# Patient Record
Sex: FEMALE | Race: BLACK OR AFRICAN AMERICAN | Employment: UNEMPLOYED | URBAN - METROPOLITAN AREA
[De-identification: names, ages, dates, MRNs, and addresses within clinical notes are randomized per-mention and may not be internally consistent; named-entity substitution may affect disease eponyms.]

---

## 2017-10-26 PROBLEM — M87.052 AVASCULAR NECROSIS OF HIP, LEFT (HCC): Status: ACTIVE | Noted: 2017-10-26

## 2018-07-18 ENCOUNTER — OFFICE VISIT (OUTPATIENT)
Dept: ORTHOPEDIC SURGERY | Facility: CLINIC | Age: 48
End: 2018-07-18

## 2018-07-18 VITALS
WEIGHT: 170.6 LBS | HEART RATE: 82 BPM | HEIGHT: 61 IN | TEMPERATURE: 98.1 F | OXYGEN SATURATION: 98 % | DIASTOLIC BLOOD PRESSURE: 82 MMHG | RESPIRATION RATE: 16 BRPM | BODY MASS INDEX: 32.21 KG/M2 | SYSTOLIC BLOOD PRESSURE: 148 MMHG

## 2018-07-18 DIAGNOSIS — M87.052 AVASCULAR NECROSIS OF LEFT FEMORAL HEAD (HCC): Primary | Chronic | ICD-10-CM

## 2018-07-18 DIAGNOSIS — M21.70 LEG LENGTH DIFFERENCE, ACQUIRED: ICD-10-CM

## 2018-07-18 DIAGNOSIS — M25.462 EFFUSION OF KNEE JOINT, LEFT: ICD-10-CM

## 2018-07-18 DIAGNOSIS — M87.00 AVASCULAR NECROSIS (HCC): ICD-10-CM

## 2018-07-18 DIAGNOSIS — M25.562 LEFT KNEE PAIN, UNSPECIFIED CHRONICITY: ICD-10-CM

## 2018-07-18 DIAGNOSIS — Z96.642 STATUS POST TOTAL HIP REPLACEMENT, LEFT: ICD-10-CM

## 2018-07-18 RX ORDER — CITALOPRAM 20 MG/1
TABLET, FILM COATED ORAL DAILY
COMMUNITY
End: 2020-06-19

## 2018-07-18 RX ORDER — DICLOFENAC SODIUM 75 MG/1
75 TABLET, DELAYED RELEASE ORAL 2 TIMES DAILY
Qty: 60 TAB | Refills: 1 | Status: SHIPPED | OUTPATIENT
Start: 2018-07-18 | End: 2018-10-26 | Stop reason: SDUPTHER

## 2018-07-18 NOTE — PROGRESS NOTES
HISTORY OF PRESENT ILLNESS:  Virgie Maurice is a 54-year-old female who had her left hip replaced in November 2017. This was done in the [facility] area. Postoperatively, she noticed a significant leg length difference. She was scheduled to have a revision done in February, but this was cancelled by the physician. She used to wear a small lift in her shoe, but this did not make up the difference at all. She does take Advil, Aleve, or Ibuprofen. She does describe some sciatic type symptoms in the left leg. She also notes some back discomfort. Her hip surgery, initially, was performed for AVN. She was told she had some AVN in her right hip, but her right hip does not bother her much at this point. She does use a roll aid for ambulation assistance. PHYSICAL EXAMINATION:   Clinical examination reveals a 54-year-old female, slightly overweight, who ambulates with a roll aid. She does not ambulate with an antalgic gait but does have a significant leg length difference clinically. With reference to her right hip, she is able straight leg raise about 80° today without discomfort. She has good passive rotation actively and passively without pain. Right hip roll test is negative. Andres's test on the right side is negative. With reference to the left hip, she is able to straight leg raise about 60-70°, but this does reproduce some pain in the sciatic nerve region. She has fair passive range of motion of the left hip but does flex up into external rotation. Left hip roll test results in slight pain in the left buttock. Andres's test is positive on the left side for pain in the anterior aspect of her left hip. She has a significant leg length discrepancy with the left leg being about 2 inches longer than her right leg. With reference to her left knee, she does have a mild effusion of her left knee. She has good functional range of motion of her left knee.   She has no palpable medial or lateral joint line tenderness. Liane's test is negative. Lachman's test is negative. She has good collateral, as well as cruciate ligament stability of the left knee. She has no left calf tenderness or swelling. Neurovascular testing is intact is intact to the lower extremities distally to motor strength and sensation. RADIOGRAPHS:  X-rays of her left knee today reveal no acute osseous pathology. On standing AP radiographs, there is a significant leg length difference on the AP radiographs of her knees with the left joint line being much more distal than her right knee. X-rays of her pelvis today reveal significant leg length discrepancy. Her femoral component appears to be slightly oversized with no significant anteversion. The femoral stem is in slight varus but appears to be well-fixed. There is, rounding off at the calcar, a remodeling there. She has mild AVN of the right hip. She has one small cyst in the superior aspect of the femoral head but no significant collapse, and there is good joint space remaining in the weightbearing portion of her right hip. This does match her clinical examination of the right hip also. IMPRESSION:      1. Leg length discrepancy secondary to total hip arthroplasty. 2. Effusion, left knee. 3. AVN right hip, relatively asymptomatic. RECOMMENDATIONS:  I have discussed with the patient her leg length discrepancy. I have discussed with her using a lift built into her shoe on the right side. I will also place her on an anti-inflammatory medication and see if this will relieve some of the inflammation in this left lower extremity and sciatic nerve. I have discussed with her revision surgery. She is seeing another opinion, i.e. Dr. Wan Jose. He referred her to [facility] and to a university setting.   I have discussed with her the potential complications of this revision surgery, including infection, bleeding, nerve damage, deep venous thrombosis, pulmonary emboli, death, failure of the prosthesis, as well failure of the procedure. We discussed hospitalization and rehabilitation postoperatively, leg length discrepancy, dislocation precautions. I also discussed with the patient alternate bearing surfaces and alternate approaches. The patient understands. I did find her component sizes and company of the prosthesis in her hospital records in case she needs them. I went through this with her today. All her questions were answered, and she is probably going to followup at the university setting, but she is aware of the risks associated with revision of this problem status post total hip arthroplasty surgery. grb                 Vitals:    07/18/18 1354   BP: 148/82   Pulse: 82   Resp: 16   Temp: 98.1 °F (36.7 °C)   TempSrc: Oral   SpO2: 98%   Weight: 170 lb 9.6 oz (77.4 kg)   Height: 5' 1\" (1.549 m)   PainSc:   8       Patient Active Problem List   Diagnosis Code    Avascular necrosis of left femoral head (HCC) M87.052    Chronic back pain M54.9, G89.29    Hormone replacement therapy Z79.890    Chronic pain syndrome G89.4    Attention deficit disorder (ADD) F98.8    Bipolar disorder (HCC) F31.9    Allergic rhinitis J30.9    Avascular necrosis of hip, left (HCC) M87.052     Patient Active Problem List    Diagnosis Date Noted    Avascular necrosis of hip, left (HCC) 10/26/2017    Chronic pain syndrome 11/18/2016    Attention deficit disorder (ADD) 11/18/2016    Bipolar disorder ([facility] 11/18/2016    Allergic rhinitis 11/18/2016    Avascular necrosis of left femoral head (HCC) 11/17/2016    Chronic back pain 11/17/2016    Hormone replacement therapy 11/17/2016     Current Outpatient Prescriptions   Medication Sig Dispense Refill    citalopram (CELEXA) 20 mg tablet Take  by mouth daily.  gabapentin (NEURONTIN) 300 mg capsule Take 900 mg by mouth two (2) times a day.  Indications: NEUROPATHIC PAIN      oxyCODONE-acetaminophen (PERCOCET)  mg per tablet Take 1 Tab by mouth every six (6) hours as needed for Pain.  hydrOXYzine HCl (ATARAX) 25 mg tablet Take 25 mg by mouth nightly.  cariprazine (VRAYLAR) 1.5 mg cap Take 1 Tab by mouth daily.  Azelastine (ASTEPRO) 0.15 % (205.5 mcg) nasal spray 2 Sprays by Both Nostrils route two (2) times a day.  fluticasone (FLONASE) 50 mcg/actuation nasal spray 2 Sprays by Both Nostrils route daily.  estradiol (ESTRACE) 1 mg tablet Take 1 mg by mouth daily.  diclofenac (VOLTAREN) 1 % gel Apply  to affected area four (4) times daily.  Indications: knees and shoulder prn       Allergies   Allergen Reactions    Other Food Anaphylaxis     Peaches- closes throat    Nectarine Itching     Past Medical History:   Diagnosis Date    Arthritis     Degenerative disc disease, lumbar     Joint pain     Neck pain     Psychiatric disorder     bipolar, anxiety and depression, ADHD, panic attacks     Past Surgical History:   Procedure Laterality Date    HX APPENDECTOMY      HX GYN      hysterectomy    HX HEENT  07/2016    nasal deviation-sinus    HX HIP REPLACEMENT Left     HX ORTHOPAEDIC Left 2016    decompression left hip     Family History   Problem Relation Age of Onset    Heart Disease Mother     Hypertension Mother     Heart Disease Maternal Grandmother      Social History   Substance Use Topics    Smoking status: Never Smoker    Smokeless tobacco: Never Used    Alcohol use 1.2 oz/week     2 Cans of beer per week

## 2018-10-26 DIAGNOSIS — M25.562 LEFT KNEE PAIN, UNSPECIFIED CHRONICITY: ICD-10-CM

## 2018-10-26 DIAGNOSIS — M87.052 AVASCULAR NECROSIS OF LEFT FEMORAL HEAD (HCC): Chronic | ICD-10-CM

## 2018-10-26 RX ORDER — DICLOFENAC SODIUM 75 MG/1
TABLET, DELAYED RELEASE ORAL
Qty: 60 TAB | Refills: 1 | Status: SHIPPED | OUTPATIENT
Start: 2018-10-26 | End: 2020-01-02 | Stop reason: SDUPTHER

## 2018-12-11 ENCOUNTER — TELEPHONE (OUTPATIENT)
Dept: ORTHOPEDIC SURGERY | Facility: CLINIC | Age: 48
End: 2018-12-11

## 2018-12-11 NOTE — TELEPHONE ENCOUNTER
Called and spoke to patient. She would like to pick them up at the [facility] office on 12/12. Advised patient they will be available for her tomorrow, and she can  at her convenience.

## 2018-12-11 NOTE — TELEPHONE ENCOUNTER
Patient called requesting to have the xrays completed on 07/18/18 given to her on a disk. Please advise patient regarding this at [phone].

## 2020-01-02 DIAGNOSIS — M25.562 LEFT KNEE PAIN, UNSPECIFIED CHRONICITY: ICD-10-CM

## 2020-01-02 DIAGNOSIS — M87.052 AVASCULAR NECROSIS OF LEFT FEMORAL HEAD (HCC): Chronic | ICD-10-CM

## 2020-01-02 RX ORDER — DICLOFENAC SODIUM 75 MG/1
75 TABLET, DELAYED RELEASE ORAL 2 TIMES DAILY
Qty: 60 TAB | Refills: 1 | Status: SHIPPED | OUTPATIENT
Start: 2020-01-02 | End: 2020-06-19

## 2020-01-02 NOTE — TELEPHONE ENCOUNTER
LMOVM asking patient to call back. If patient calls back please let her know Rx has been e-scribed to pharmacy in chart.

## 2020-01-30 PROBLEM — M54.16 RADICULOPATHY, LUMBAR REGION: Status: ACTIVE | Noted: 2020-01-30

## 2020-06-02 ENCOUNTER — HOSPITAL ENCOUNTER (OUTPATIENT)
Dept: NON INVASIVE DIAGNOSTICS | Age: 50
Discharge: HOME | End: 2020-06-02
Admitting: ORTHOPAEDIC SURGERY
Payer: COMMERCIAL

## 2020-06-02 ENCOUNTER — HOSPITAL ENCOUNTER (OUTPATIENT)
Dept: LAB | Age: 50
Discharge: HOME | End: 2020-06-02

## 2020-06-02 LAB — SENTARA SPECIMEN COL,SENBCF: NORMAL

## 2020-06-02 PROCEDURE — 99001 SPECIMEN HANDLING PT-LAB: CPT

## 2020-06-02 PROCEDURE — 93005 ELECTROCARDIOGRAM TRACING: CPT

## 2020-06-03 LAB
ATRIAL RATE: 83 BPM
CALCULATED P AXIS, ECG09: 66 DEGREES
CALCULATED R AXIS, ECG10: -17 DEGREES
CALCULATED T AXIS, ECG11: 32 DEGREES
DIAGNOSIS, 93000: NORMAL
P-R INTERVAL, ECG05: 154 MS
Q-T INTERVAL, ECG07: 402 MS
QRS DURATION, ECG06: 90 MS
QTC CALCULATION (BEZET), ECG08: 472 MS
VENTRICULAR RATE, ECG03: 83 BPM

## 2020-06-12 PROBLEM — Z96.649 FAILED TOTAL HIP ARTHROPLASTY (HCC): Chronic | Status: ACTIVE | Noted: 2020-06-12

## 2020-06-12 PROBLEM — T84.018A FAILED TOTAL HIP ARTHROPLASTY (HCC): Chronic | Status: ACTIVE | Noted: 2020-06-12

## 2020-06-23 ENCOUNTER — HOSPITAL ENCOUNTER (OUTPATIENT)
Dept: PREADMISSION TESTING | Age: 50
Discharge: HOME | End: 2020-06-23
Payer: COMMERCIAL

## 2020-06-23 PROCEDURE — 87635 SARS-COV-2 COVID-19 AMP PRB: CPT

## 2020-06-24 NOTE — H&P
[address] [facility] [address]  History and Physical Exam    Patient: Juan Gong MRN: [id number]  SSN: [id number]    YOB: 1970  Age: 45 y.o. Sex: female      Subjective:      Chief Complaint: right hip pain    History of Present Illness:  Patient complains of right hip pain and difficulty ambulating, which has progressed over the past several months. She is status post THR by Dr. Breana Andino in 2017. X-rays showed THR compoennts of the joints with lucency around the stem with too long of a stem and malpositioned cup. The patient's pain has persisted and progressed despite conservative treatments and therapies. The patient has been previously treated with nsaids. The patient has at this time opted for surgical intervention. Past Medical History:   Diagnosis Date    Arthritis     Degenerative disc disease, lumbar     Failed total hip arthroplasty ([facility] [address] 6/12/2020    Joint pain     Neck pain     Psychiatric disorder     bipolar, anxiety and depression, ADHD, panic attacks     Past Surgical History:   Procedure Laterality Date    HX APPENDECTOMY      HX GYN      hysterectomy    HX HEENT  07/2016    nasal deviation-sinus    HX HIP REPLACEMENT Left     HX ORTHOPAEDIC Left 2016    decompression left hip    HX ORTHOPAEDIC Left     hip replacement     Social History     Occupational History    Not on file   Tobacco Use    Smoking status: Never Smoker    Smokeless tobacco: Never Used   Substance and Sexual Activity    Alcohol use: Yes     Alcohol/week: 3.0 - 10.0 standard drinks     Types: 3 - 10 Glasses of wine per week    Drug use: Yes     Types: Marijuana    Sexual activity: Not Currently     Partners: Female     Prior to Admission medications    Medication Sig Start Date End Date Taking? Authorizing Provider   multivitamin (ONE A DAY) tablet Take 1 Tab by mouth daily. Provider, Historical   risperiDONE (RisperDAL) 3 mg tablet Take 3 mg by mouth nightly. Provider, Historical   risperiDONE (RisperDAL) 1 mg tablet Take 1 mg by mouth daily. Provider, Historical   citalopram (CELEXA) 40 mg tablet Take 40 mg by mouth daily. Provider, Historical   estradioL (CLIMARA) 0.1 mg/24 hr 1 Patch by TransDERmal route Every Saturday. Provider, Historical   fluticasone (FLONASE) 50 mcg/actuation nasal spray 2 Sprays by Both Nostrils route daily. Provider, Historical       Allergies: Allergies   Allergen Reactions    Other Food Anaphylaxis     Peaches- closes throat    Nectarine Itching        Review of Systems:  A comprehensive review of systems was negative except for that written in the History of Present Illness. Objective:       Physical Exam:  HEENT: Normocephalic, atraumatic  Lungs:  Clear to auscultation  Heart:   Regular rate and rhythm  Abdomen: Soft  Extremities:  Pain with range of motion of the right hip. Passive flexion  degrees,                       passive internal rotation 0-10 degrees, with pain throughout ROM,                        passive external rotation 10-20 degrees with pain at the arc of motion. Antalgic gait noted. 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 Plan:       Proceed with scheduled REVISION RIGHT TOTAL HIP ARTHROPLASTY. The various methods of treatment have been discussed with the patient and family. After consideration of risks, benefits, and other options for treatment, the patient has consented to surgical interventions. Questions were answered and preoperative teaching was done by Dr Karen Mansfield. The patient would benefit from the use of Vancomycin for antibiotic prophylaxis due to increased risk of infection.     Signed By: YAYA Olmedo     June 24, 2020

## 2020-06-25 LAB — SARS-COV-2, COV2NT: NOT DETECTED

## 2020-06-28 ENCOUNTER — ANESTHESIA EVENT (OUTPATIENT)
Dept: SURGERY | Age: 50
DRG: 301 | End: 2020-06-28
Payer: MEDICAID

## 2020-06-29 ENCOUNTER — HOSPITAL ENCOUNTER (INPATIENT)
Age: 50
LOS: 1 days | Discharge: HOME HEALTH | DRG: 301 | End: 2020-06-29
Attending: ORTHOPAEDIC SURGERY | Admitting: ORTHOPAEDIC SURGERY
Payer: MEDICAID

## 2020-06-29 ENCOUNTER — APPOINTMENT (OUTPATIENT)
Dept: GENERAL RADIOLOGY | Age: 50
DRG: 301 | End: 2020-06-29
Attending: ORTHOPAEDIC SURGERY
Payer: MEDICAID

## 2020-06-29 ENCOUNTER — ANESTHESIA (OUTPATIENT)
Dept: SURGERY | Age: 50
DRG: 301 | End: 2020-06-29
Payer: MEDICAID

## 2020-06-29 ENCOUNTER — APPOINTMENT (OUTPATIENT)
Dept: GENERAL RADIOLOGY | Age: 50
DRG: 301 | End: 2020-06-29
Attending: PHYSICIAN ASSISTANT
Payer: MEDICAID

## 2020-06-29 ENCOUNTER — HOME HEALTH ADMISSION (OUTPATIENT)
Dept: HOME HEALTH SERVICES | Facility: HOME HEALTH | Age: 50
End: 2020-06-29
Payer: MEDICAID

## 2020-06-29 VITALS
OXYGEN SATURATION: 98 % | HEART RATE: 71 BPM | RESPIRATION RATE: 16 BRPM | SYSTOLIC BLOOD PRESSURE: 118 MMHG | TEMPERATURE: 97.4 F | HEIGHT: 61 IN | WEIGHT: 174.25 LBS | DIASTOLIC BLOOD PRESSURE: 72 MMHG | BODY MASS INDEX: 32.9 KG/M2

## 2020-06-29 DIAGNOSIS — T84.011A FAILURE OF LEFT TOTAL HIP ARTHROPLASTY, INITIAL ENCOUNTER (HCC): Primary | Chronic | ICD-10-CM

## 2020-06-29 LAB
ABO + RH BLD: NORMAL
BLOOD GROUP ANTIBODIES SERPL: NORMAL
SPECIMEN EXP DATE BLD: NORMAL

## 2020-06-29 PROCEDURE — 77030034694 HC SCPL CANADY PLSM DISP USMD -E: Performed by: ORTHOPAEDIC SURGERY

## 2020-06-29 PROCEDURE — 36415 COLL VENOUS BLD VENIPUNCTURE: CPT

## 2020-06-29 PROCEDURE — 77030040361 HC SLV COMPR DVT MDII -B: Performed by: ORTHOPAEDIC SURGERY

## 2020-06-29 PROCEDURE — 77030037713 HC CLOSR DEV INCIS ZIP STRY -B: Performed by: ORTHOPAEDIC SURGERY

## 2020-06-29 PROCEDURE — 74011250636 HC RX REV CODE- 250/636: Performed by: ORTHOPAEDIC SURGERY

## 2020-06-29 PROCEDURE — 77030041075 HC DRSG AG OPTIFRM MDII -B: Performed by: ORTHOPAEDIC SURGERY

## 2020-06-29 PROCEDURE — 74011250636 HC RX REV CODE- 250/636: Performed by: ANESTHESIOLOGY

## 2020-06-29 PROCEDURE — 77030027138 HC INCENT SPIROMETER -A: Performed by: ORTHOPAEDIC SURGERY

## 2020-06-29 PROCEDURE — C1776 JOINT DEVICE (IMPLANTABLE): HCPCS | Performed by: ORTHOPAEDIC SURGERY

## 2020-06-29 PROCEDURE — 65270000029 HC RM PRIVATE

## 2020-06-29 PROCEDURE — 0SPB09Z REMOVAL OF LINER FROM LEFT HIP JOINT, OPEN APPROACH: ICD-10-PCS | Performed by: ORTHOPAEDIC SURGERY

## 2020-06-29 PROCEDURE — 0SPS0JZ REMOVAL OF SYNTHETIC SUBSTITUTE FROM LEFT HIP JOINT, FEMORAL SURFACE, OPEN APPROACH: ICD-10-PCS | Performed by: ORTHOPAEDIC SURGERY

## 2020-06-29 PROCEDURE — 97116 GAIT TRAINING THERAPY: CPT

## 2020-06-29 PROCEDURE — 0SUS09Z SUPPLEMENT LEFT HIP JOINT, FEMORAL SURFACE WITH LINER, OPEN APPROACH: ICD-10-PCS | Performed by: ORTHOPAEDIC SURGERY

## 2020-06-29 PROCEDURE — 74011250636 HC RX REV CODE- 250/636: Performed by: PHYSICIAN ASSISTANT

## 2020-06-29 PROCEDURE — 74011250637 HC RX REV CODE- 250/637: Performed by: ORTHOPAEDIC SURGERY

## 2020-06-29 PROCEDURE — 77030031139 HC SUT VCRL2 J&J -A: Performed by: ORTHOPAEDIC SURGERY

## 2020-06-29 PROCEDURE — 76060000035 HC ANESTHESIA 2 TO 2.5 HR: Performed by: ORTHOPAEDIC SURGERY

## 2020-06-29 PROCEDURE — 73501 X-RAY EXAM HIP UNI 1 VIEW: CPT

## 2020-06-29 PROCEDURE — 77030013708 HC HNDPC SUC IRR PULS STRY –B: Performed by: ORTHOPAEDIC SURGERY

## 2020-06-29 PROCEDURE — 97165 OT EVAL LOW COMPLEX 30 MIN: CPT

## 2020-06-29 PROCEDURE — 74011000250 HC RX REV CODE- 250: Performed by: NURSE ANESTHETIST, CERTIFIED REGISTERED

## 2020-06-29 PROCEDURE — 0SRS03Z REPLACEMENT OF LEFT HIP JOINT, FEMORAL SURFACE WITH CERAMIC SYNTHETIC SUBSTITUTE, OPEN APPROACH: ICD-10-PCS | Performed by: ORTHOPAEDIC SURGERY

## 2020-06-29 PROCEDURE — 77030025006 HC BUR STRY -C: Performed by: ORTHOPAEDIC SURGERY

## 2020-06-29 PROCEDURE — 77030022295 HC SEAL BPLR VSL DISP MEDT -F: Performed by: ORTHOPAEDIC SURGERY

## 2020-06-29 PROCEDURE — 74011250636 HC RX REV CODE- 250/636: Performed by: NURSE ANESTHETIST, CERTIFIED REGISTERED

## 2020-06-29 PROCEDURE — 77030020782 HC GWN BAIR PAWS FLX 3M -B: Performed by: ORTHOPAEDIC SURGERY

## 2020-06-29 PROCEDURE — 87205 SMEAR GRAM STAIN: CPT

## 2020-06-29 PROCEDURE — 74011000250 HC RX REV CODE- 250: Performed by: ORTHOPAEDIC SURGERY

## 2020-06-29 PROCEDURE — 97161 PT EVAL LOW COMPLEX 20 MIN: CPT

## 2020-06-29 PROCEDURE — 77030038010: Performed by: ORTHOPAEDIC SURGERY

## 2020-06-29 PROCEDURE — 77030018835 HC SOL IRR LR ICUM -A: Performed by: ORTHOPAEDIC SURGERY

## 2020-06-29 PROCEDURE — 77030002934 HC SUT MCRYL J&J -B: Performed by: ORTHOPAEDIC SURGERY

## 2020-06-29 PROCEDURE — 74011250637 HC RX REV CODE- 250/637: Performed by: PHYSICIAN ASSISTANT

## 2020-06-29 PROCEDURE — 87075 CULTR BACTERIA EXCEPT BLOOD: CPT

## 2020-06-29 PROCEDURE — 97535 SELF CARE MNGMENT TRAINING: CPT

## 2020-06-29 PROCEDURE — 76210000016 HC OR PH I REC 1 TO 1.5 HR: Performed by: ORTHOPAEDIC SURGERY

## 2020-06-29 PROCEDURE — 76010000131 HC OR TIME 2 TO 2.5 HR: Performed by: ORTHOPAEDIC SURGERY

## 2020-06-29 PROCEDURE — 77010033678 HC OXYGEN DAILY

## 2020-06-29 PROCEDURE — 74011250637 HC RX REV CODE- 250/637: Performed by: ANESTHESIOLOGY

## 2020-06-29 PROCEDURE — 86900 BLOOD TYPING SEROLOGIC ABO: CPT

## 2020-06-29 DEVICE — INSERT ACET CUP X3 28X48MM -- RESTORATION ADM: Type: IMPLANTABLE DEVICE | Site: HIP | Status: FUNCTIONAL

## 2020-06-29 DEVICE — IMPLANTABLE DEVICE: Type: IMPLANTABLE DEVICE | Site: HIP | Status: FUNCTIONAL

## 2020-06-29 DEVICE — HEAD FEM DELT V40 0MM NK 28MM -- V40 BIOLOX: Type: IMPLANTABLE DEVICE | Site: HIP | Status: FUNCTIONAL

## 2020-06-29 RX ORDER — HYDROMORPHONE HYDROCHLORIDE 2 MG/ML
0.2 INJECTION, SOLUTION INTRAMUSCULAR; INTRAVENOUS; SUBCUTANEOUS AS NEEDED
Status: DISCONTINUED | OUTPATIENT
Start: 2020-06-29 | End: 2020-06-29 | Stop reason: HOSPADM

## 2020-06-29 RX ORDER — ONDANSETRON 2 MG/ML
4 INJECTION INTRAMUSCULAR; INTRAVENOUS
Status: DISCONTINUED | OUTPATIENT
Start: 2020-06-29 | End: 2020-06-29 | Stop reason: HOSPADM

## 2020-06-29 RX ORDER — ALBUTEROL SULFATE 0.83 MG/ML
2.5 SOLUTION RESPIRATORY (INHALATION)
Status: DISCONTINUED | OUTPATIENT
Start: 2020-06-29 | End: 2020-06-29 | Stop reason: HOSPADM

## 2020-06-29 RX ORDER — SODIUM CHLORIDE 9 MG/ML
125 INJECTION, SOLUTION INTRAVENOUS CONTINUOUS
Status: DISCONTINUED | OUTPATIENT
Start: 2020-06-29 | End: 2020-06-29 | Stop reason: HOSPADM

## 2020-06-29 RX ORDER — SODIUM CHLORIDE 9 MG/ML
300 INJECTION, SOLUTION INTRAVENOUS CONTINUOUS
Status: DISPENSED | OUTPATIENT
Start: 2020-06-29 | End: 2020-06-29

## 2020-06-29 RX ORDER — GUAIFENESIN 100 MG/5ML
81 LIQUID (ML) ORAL 2 TIMES DAILY
Qty: 42 TAB | Refills: 0 | Status: SHIPPED | OUTPATIENT
Start: 2020-06-29 | End: 2020-07-20

## 2020-06-29 RX ORDER — SODIUM CHLORIDE 0.9 % (FLUSH) 0.9 %
5-40 SYRINGE (ML) INJECTION EVERY 8 HOURS
Status: DISCONTINUED | OUTPATIENT
Start: 2020-06-29 | End: 2020-06-29 | Stop reason: HOSPADM

## 2020-06-29 RX ORDER — RISPERIDONE 1 MG/1
3 TABLET, FILM COATED ORAL
Status: DISCONTINUED | OUTPATIENT
Start: 2020-06-29 | End: 2020-06-29 | Stop reason: HOSPADM

## 2020-06-29 RX ORDER — DOCUSATE SODIUM 100 MG/1
100 CAPSULE, LIQUID FILLED ORAL 2 TIMES DAILY
Status: DISCONTINUED | OUTPATIENT
Start: 2020-06-29 | End: 2020-06-29 | Stop reason: HOSPADM

## 2020-06-29 RX ORDER — ZOLPIDEM TARTRATE 5 MG/1
5-10 TABLET ORAL
Status: DISCONTINUED | OUTPATIENT
Start: 2020-06-29 | End: 2020-06-29 | Stop reason: HOSPADM

## 2020-06-29 RX ORDER — LIDOCAINE HYDROCHLORIDE 20 MG/ML
INJECTION, SOLUTION EPIDURAL; INFILTRATION; INTRACAUDAL; PERINEURAL AS NEEDED
Status: DISCONTINUED | OUTPATIENT
Start: 2020-06-29 | End: 2020-06-29 | Stop reason: HOSPADM

## 2020-06-29 RX ORDER — SODIUM CHLORIDE 0.9 % (FLUSH) 0.9 %
5-40 SYRINGE (ML) INJECTION AS NEEDED
Status: DISCONTINUED | OUTPATIENT
Start: 2020-06-29 | End: 2020-06-29 | Stop reason: HOSPADM

## 2020-06-29 RX ORDER — KETOROLAC TROMETHAMINE 15 MG/ML
INJECTION, SOLUTION INTRAMUSCULAR; INTRAVENOUS AS NEEDED
Status: DISCONTINUED | OUTPATIENT
Start: 2020-06-29 | End: 2020-06-29 | Stop reason: HOSPADM

## 2020-06-29 RX ORDER — DIPHENHYDRAMINE HYDROCHLORIDE 50 MG/ML
12.5 INJECTION, SOLUTION INTRAMUSCULAR; INTRAVENOUS
Status: DISCONTINUED | OUTPATIENT
Start: 2020-06-29 | End: 2020-06-29 | Stop reason: HOSPADM

## 2020-06-29 RX ORDER — TRANEXAMIC ACID 10 MG/ML
1 INJECTION, SOLUTION INTRAVENOUS SEE ADMIN INSTRUCTIONS
Status: COMPLETED | OUTPATIENT
Start: 2020-06-29 | End: 2020-06-29

## 2020-06-29 RX ORDER — CEFAZOLIN SODIUM 2 G/50ML
2 SOLUTION INTRAVENOUS EVERY 8 HOURS
Status: DISCONTINUED | OUTPATIENT
Start: 2020-06-29 | End: 2020-06-29 | Stop reason: HOSPADM

## 2020-06-29 RX ORDER — MIDAZOLAM HYDROCHLORIDE 1 MG/ML
INJECTION, SOLUTION INTRAMUSCULAR; INTRAVENOUS AS NEEDED
Status: DISCONTINUED | OUTPATIENT
Start: 2020-06-29 | End: 2020-06-29 | Stop reason: HOSPADM

## 2020-06-29 RX ORDER — PROPOFOL 10 MG/ML
INJECTION, EMULSION INTRAVENOUS AS NEEDED
Status: DISCONTINUED | OUTPATIENT
Start: 2020-06-29 | End: 2020-06-29 | Stop reason: HOSPADM

## 2020-06-29 RX ORDER — GLYCOPYRROLATE 0.2 MG/ML
INJECTION INTRAMUSCULAR; INTRAVENOUS AS NEEDED
Status: DISCONTINUED | OUTPATIENT
Start: 2020-06-29 | End: 2020-06-29 | Stop reason: HOSPADM

## 2020-06-29 RX ORDER — DEXAMETHASONE SODIUM PHOSPHATE 4 MG/ML
8 INJECTION, SOLUTION INTRA-ARTICULAR; INTRALESIONAL; INTRAMUSCULAR; INTRAVENOUS; SOFT TISSUE ONCE
Status: COMPLETED | OUTPATIENT
Start: 2020-06-29 | End: 2020-06-29

## 2020-06-29 RX ORDER — MELOXICAM 7.5 MG/1
7.5 TABLET ORAL 2 TIMES DAILY
Qty: 28 TAB | Refills: 0 | Status: SHIPPED | OUTPATIENT
Start: 2020-06-29 | End: 2020-07-13

## 2020-06-29 RX ORDER — OXYCODONE AND ACETAMINOPHEN 5; 325 MG/1; MG/1
1 TABLET ORAL
Qty: 60 TAB | Refills: 0 | Status: SHIPPED | OUTPATIENT
Start: 2020-06-29 | End: 2020-07-06

## 2020-06-29 RX ORDER — FLUTICASONE PROPIONATE 50 MCG
2 SPRAY, SUSPENSION (ML) NASAL DAILY
Status: DISCONTINUED | OUTPATIENT
Start: 2020-06-29 | End: 2020-06-29 | Stop reason: HOSPADM

## 2020-06-29 RX ORDER — SODIUM CHLORIDE, SODIUM LACTATE, POTASSIUM CHLORIDE, CALCIUM CHLORIDE 600; 310; 30; 20 MG/100ML; MG/100ML; MG/100ML; MG/100ML
125 INJECTION, SOLUTION INTRAVENOUS CONTINUOUS
Status: DISCONTINUED | OUTPATIENT
Start: 2020-06-29 | End: 2020-06-29 | Stop reason: HOSPADM

## 2020-06-29 RX ORDER — SODIUM CHLORIDE, SODIUM LACTATE, POTASSIUM CHLORIDE, CALCIUM CHLORIDE 600; 310; 30; 20 MG/100ML; MG/100ML; MG/100ML; MG/100ML
100 INJECTION, SOLUTION INTRAVENOUS CONTINUOUS
Status: DISCONTINUED | OUTPATIENT
Start: 2020-06-29 | End: 2020-06-29 | Stop reason: HOSPADM

## 2020-06-29 RX ORDER — NALOXONE HYDROCHLORIDE 0.4 MG/ML
0.4 INJECTION, SOLUTION INTRAMUSCULAR; INTRAVENOUS; SUBCUTANEOUS AS NEEDED
Status: DISCONTINUED | OUTPATIENT
Start: 2020-06-29 | End: 2020-06-29 | Stop reason: HOSPADM

## 2020-06-29 RX ORDER — CELECOXIB 100 MG/1
400 CAPSULE ORAL ONCE
Status: COMPLETED | OUTPATIENT
Start: 2020-06-29 | End: 2020-06-29

## 2020-06-29 RX ORDER — DIAZEPAM 5 MG/1
10 TABLET ORAL ONCE
Status: DISCONTINUED | OUTPATIENT
Start: 2020-06-29 | End: 2020-06-29 | Stop reason: HOSPADM

## 2020-06-29 RX ORDER — METOCLOPRAMIDE HYDROCHLORIDE 5 MG/ML
10 INJECTION INTRAMUSCULAR; INTRAVENOUS
Status: DISCONTINUED | OUTPATIENT
Start: 2020-06-29 | End: 2020-06-29 | Stop reason: HOSPADM

## 2020-06-29 RX ORDER — ACETAMINOPHEN 500 MG
1000 TABLET ORAL ONCE
Status: COMPLETED | OUTPATIENT
Start: 2020-06-29 | End: 2020-06-29

## 2020-06-29 RX ORDER — HYDROXYZINE 25 MG/1
25 TABLET, FILM COATED ORAL
Status: DISCONTINUED | OUTPATIENT
Start: 2020-06-29 | End: 2020-06-29 | Stop reason: HOSPADM

## 2020-06-29 RX ORDER — GABAPENTIN 300 MG/1
300 CAPSULE ORAL ONCE
Status: COMPLETED | OUTPATIENT
Start: 2020-06-29 | End: 2020-06-29

## 2020-06-29 RX ORDER — TRANEXAMIC ACID 650 1/1
1950 TABLET ORAL ONCE
Status: DISCONTINUED | OUTPATIENT
Start: 2020-06-29 | End: 2020-06-29 | Stop reason: CLARIF

## 2020-06-29 RX ORDER — ONDANSETRON 2 MG/ML
INJECTION INTRAMUSCULAR; INTRAVENOUS AS NEEDED
Status: DISCONTINUED | OUTPATIENT
Start: 2020-06-29 | End: 2020-06-29 | Stop reason: HOSPADM

## 2020-06-29 RX ORDER — GUAIFENESIN 100 MG/5ML
81 LIQUID (ML) ORAL 2 TIMES DAILY
Qty: 42 TAB | Refills: 0 | Status: SHIPPED | OUTPATIENT
Start: 2020-06-29 | End: 2020-06-29 | Stop reason: SDUPTHER

## 2020-06-29 RX ORDER — DEXMEDETOMIDINE HYDROCHLORIDE 100 UG/ML
INJECTION, SOLUTION INTRAVENOUS AS NEEDED
Status: DISCONTINUED | OUTPATIENT
Start: 2020-06-29 | End: 2020-06-29 | Stop reason: HOSPADM

## 2020-06-29 RX ORDER — RISPERIDONE 1 MG/1
1 TABLET, FILM COATED ORAL DAILY
Status: DISCONTINUED | OUTPATIENT
Start: 2020-06-29 | End: 2020-06-29 | Stop reason: HOSPADM

## 2020-06-29 RX ORDER — ASPIRIN 81 MG/1
81 TABLET ORAL 2 TIMES DAILY
Status: DISCONTINUED | OUTPATIENT
Start: 2020-06-29 | End: 2020-06-29 | Stop reason: HOSPADM

## 2020-06-29 RX ORDER — KETAMINE HYDROCHLORIDE 10 MG/ML
INJECTION, SOLUTION INTRAMUSCULAR; INTRAVENOUS AS NEEDED
Status: DISCONTINUED | OUTPATIENT
Start: 2020-06-29 | End: 2020-06-29 | Stop reason: HOSPADM

## 2020-06-29 RX ORDER — NALOXONE HYDROCHLORIDE 0.4 MG/ML
0.1 INJECTION, SOLUTION INTRAMUSCULAR; INTRAVENOUS; SUBCUTANEOUS AS NEEDED
Status: DISCONTINUED | OUTPATIENT
Start: 2020-06-29 | End: 2020-06-29 | Stop reason: HOSPADM

## 2020-06-29 RX ORDER — MELOXICAM 7.5 MG/1
7.5 TABLET ORAL 2 TIMES DAILY
Qty: 28 TAB | Refills: 0 | Status: SHIPPED | OUTPATIENT
Start: 2020-06-29 | End: 2020-06-29 | Stop reason: SDUPTHER

## 2020-06-29 RX ORDER — CITALOPRAM 20 MG/1
20 TABLET, FILM COATED ORAL DAILY
Status: DISCONTINUED | OUTPATIENT
Start: 2020-06-29 | End: 2020-06-29 | Stop reason: HOSPADM

## 2020-06-29 RX ORDER — OXYCODONE HYDROCHLORIDE 5 MG/1
5-10 TABLET ORAL
Status: DISCONTINUED | OUTPATIENT
Start: 2020-06-29 | End: 2020-06-29 | Stop reason: HOSPADM

## 2020-06-29 RX ORDER — CITALOPRAM 20 MG/1
40 TABLET, FILM COATED ORAL DAILY
Status: DISCONTINUED | OUTPATIENT
Start: 2020-06-29 | End: 2020-06-29 | Stop reason: HOSPADM

## 2020-06-29 RX ORDER — PANTOPRAZOLE SODIUM 40 MG/1
40 TABLET, DELAYED RELEASE ORAL
Status: COMPLETED | OUTPATIENT
Start: 2020-06-29 | End: 2020-06-29

## 2020-06-29 RX ORDER — CEFAZOLIN SODIUM 2 G/50ML
2 SOLUTION INTRAVENOUS ONCE
Status: COMPLETED | OUTPATIENT
Start: 2020-06-29 | End: 2020-06-29

## 2020-06-29 RX ORDER — CEFADROXIL 500 MG/1
500 CAPSULE ORAL 2 TIMES DAILY
Qty: 10 CAP | Refills: 0 | Status: SHIPPED | OUTPATIENT
Start: 2020-06-29 | End: 2020-06-29 | Stop reason: SDUPTHER

## 2020-06-29 RX ORDER — LANOLIN ALCOHOL/MO/W.PET/CERES
1 CREAM (GRAM) TOPICAL 3 TIMES DAILY
Status: DISCONTINUED | OUTPATIENT
Start: 2020-06-29 | End: 2020-06-29 | Stop reason: HOSPADM

## 2020-06-29 RX ORDER — ACETAMINOPHEN 325 MG/1
650 TABLET ORAL EVERY 6 HOURS
Status: DISCONTINUED | OUTPATIENT
Start: 2020-06-29 | End: 2020-06-29 | Stop reason: HOSPADM

## 2020-06-29 RX ORDER — CEFADROXIL 500 MG/1
500 CAPSULE ORAL 2 TIMES DAILY
Qty: 10 CAP | Refills: 0 | Status: SHIPPED | OUTPATIENT
Start: 2020-06-29 | End: 2020-07-04

## 2020-06-29 RX ORDER — GABAPENTIN 300 MG/1
900 CAPSULE ORAL 2 TIMES DAILY
Status: DISCONTINUED | OUTPATIENT
Start: 2020-06-29 | End: 2020-06-29 | Stop reason: HOSPADM

## 2020-06-29 RX ORDER — HYDROMORPHONE HYDROCHLORIDE 2 MG/ML
0.5 INJECTION, SOLUTION INTRAMUSCULAR; INTRAVENOUS; SUBCUTANEOUS
Status: DISCONTINUED | OUTPATIENT
Start: 2020-06-29 | End: 2020-06-29 | Stop reason: HOSPADM

## 2020-06-29 RX ORDER — HYDROMORPHONE HYDROCHLORIDE 2 MG/ML
INJECTION, SOLUTION INTRAMUSCULAR; INTRAVENOUS; SUBCUTANEOUS AS NEEDED
Status: DISCONTINUED | OUTPATIENT
Start: 2020-06-29 | End: 2020-06-29 | Stop reason: HOSPADM

## 2020-06-29 RX ORDER — KETOROLAC TROMETHAMINE 30 MG/ML
15 INJECTION, SOLUTION INTRAMUSCULAR; INTRAVENOUS EVERY 6 HOURS
Status: DISCONTINUED | OUTPATIENT
Start: 2020-06-29 | End: 2020-06-29 | Stop reason: HOSPADM

## 2020-06-29 RX ADMIN — SODIUM CHLORIDE, SODIUM LACTATE, POTASSIUM CHLORIDE, AND CALCIUM CHLORIDE 100 ML/HR: 600; 310; 30; 20 INJECTION, SOLUTION INTRAVENOUS at 10:49

## 2020-06-29 RX ADMIN — VANCOMYCIN HYDROCHLORIDE 1250 MG: 1.25 INJECTION, POWDER, LYOPHILIZED, FOR SOLUTION INTRAVENOUS at 07:17

## 2020-06-29 RX ADMIN — PANTOPRAZOLE SODIUM 40 MG: 40 TABLET, DELAYED RELEASE ORAL at 06:16

## 2020-06-29 RX ADMIN — TRANEXAMIC ACID 1 G: 10 INJECTION, SOLUTION INTRAVENOUS at 07:23

## 2020-06-29 RX ADMIN — MIDAZOLAM 2 MG: 1 INJECTION INTRAMUSCULAR; INTRAVENOUS at 07:23

## 2020-06-29 RX ADMIN — GABAPENTIN 300 MG: 300 CAPSULE ORAL at 06:16

## 2020-06-29 RX ADMIN — GLYCOPYRROLATE 0.2 MG: 0.2 INJECTION INTRAMUSCULAR; INTRAVENOUS at 07:23

## 2020-06-29 RX ADMIN — KETOROLAC TROMETHAMINE 30 MG: 15 INJECTION, SOLUTION INTRAMUSCULAR; INTRAVENOUS at 09:19

## 2020-06-29 RX ADMIN — LIDOCAINE HYDROCHLORIDE 80 MG: 20 INJECTION, SOLUTION EPIDURAL; INFILTRATION; INTRACAUDAL; PERINEURAL at 07:31

## 2020-06-29 RX ADMIN — KETAMINE HYDROCHLORIDE 50 MG: 10 INJECTION, SOLUTION INTRAMUSCULAR; INTRAVENOUS at 07:31

## 2020-06-29 RX ADMIN — DOCUSATE SODIUM 100 MG: 100 CAPSULE, LIQUID FILLED ORAL at 12:38

## 2020-06-29 RX ADMIN — PROPOFOL 200 MG: 10 INJECTION, EMULSION INTRAVENOUS at 07:31

## 2020-06-29 RX ADMIN — CITALOPRAM HYDROBROMIDE 40 MG: 20 TABLET ORAL at 12:38

## 2020-06-29 RX ADMIN — ONDANSETRON HYDROCHLORIDE 4 MG: 2 INJECTION INTRAMUSCULAR; INTRAVENOUS at 07:33

## 2020-06-29 RX ADMIN — HYDROMORPHONE HYDROCHLORIDE 1 MG: 2 INJECTION, SOLUTION INTRAMUSCULAR; INTRAVENOUS; SUBCUTANEOUS at 09:38

## 2020-06-29 RX ADMIN — ACETAMINOPHEN 1000 MG: 500 TABLET ORAL at 06:16

## 2020-06-29 RX ADMIN — ACETAMINOPHEN 650 MG: 325 TABLET ORAL at 12:38

## 2020-06-29 RX ADMIN — SODIUM CHLORIDE 300 ML/HR: 900 INJECTION, SOLUTION INTRAVENOUS at 11:37

## 2020-06-29 RX ADMIN — CEFAZOLIN SODIUM 2 G: 2 SOLUTION INTRAVENOUS at 15:14

## 2020-06-29 RX ADMIN — CELECOXIB 400 MG: 100 CAPSULE ORAL at 06:16

## 2020-06-29 RX ADMIN — TRANEXAMIC ACID 1 G: 10 INJECTION, SOLUTION INTRAVENOUS at 09:19

## 2020-06-29 RX ADMIN — FERROUS SULFATE TAB 325 MG (65 MG ELEMENTAL FE) 325 MG: 325 (65 FE) TAB at 15:23

## 2020-06-29 RX ADMIN — SODIUM CHLORIDE, SODIUM LACTATE, POTASSIUM CHLORIDE, AND CALCIUM CHLORIDE 1000 ML: 600; 310; 30; 20 INJECTION, SOLUTION INTRAVENOUS at 06:15

## 2020-06-29 RX ADMIN — KETOROLAC TROMETHAMINE 15 MG: 30 INJECTION, SOLUTION INTRAMUSCULAR at 12:37

## 2020-06-29 RX ADMIN — ASPIRIN 81 MG: 81 TABLET, COATED ORAL at 12:39

## 2020-06-29 RX ADMIN — RISPERIDONE 1 MG: 1 TABLET ORAL at 12:43

## 2020-06-29 RX ADMIN — DEXMEDETOMIDINE HYDROCHLORIDE 16 MCG: 100 INJECTION, SOLUTION INTRAVENOUS at 07:33

## 2020-06-29 RX ADMIN — DEXAMETHASONE SODIUM PHOSPHATE 8 MG: 4 INJECTION, SOLUTION INTRAMUSCULAR; INTRAVENOUS at 06:16

## 2020-06-29 RX ADMIN — SODIUM CHLORIDE, SODIUM LACTATE, POTASSIUM CHLORIDE, AND CALCIUM CHLORIDE 125 ML/HR: 600; 310; 30; 20 INJECTION, SOLUTION INTRAVENOUS at 06:15

## 2020-06-29 RX ADMIN — CEFAZOLIN SODIUM 2 G: 2 SOLUTION INTRAVENOUS at 07:13

## 2020-06-29 RX ADMIN — HYDROMORPHONE HYDROCHLORIDE 1 MG: 2 INJECTION, SOLUTION INTRAMUSCULAR; INTRAVENOUS; SUBCUTANEOUS at 07:31

## 2020-06-29 RX ADMIN — MULTIPLE VITAMINS W/ MINERALS TAB 1 TABLET: TAB at 12:38

## 2020-06-29 RX ADMIN — GABAPENTIN 900 MG: 300 CAPSULE ORAL at 12:37

## 2020-06-29 NOTE — PROGRESS NOTES
[facility] at this time. Patient alert and oriented x4. Denies SOB, chest pain. Shows no signs of distress. Patient lungs clear bilaterally. Cap refill < 3 sec to all extremities. Patient has 18G IV to R hand CDI. Stated pain 0/10. Dressing to L hip is CDI. SCDs and julia stockings applied to BLE. Incentive spirometer at bedside. Patient reaches 1300 on IS. Bowel sounds hypoactive. Skin intact. Dual skin assessment completed with GABRIEL Begum. Patient call light and possessions within reach. Ortho orientation and discharge book given to patient. Bed locked and in lowest position. Nurse will continue to monitor. 1220  Walked to bathroom to void 200 mL.    1345  Patient sitting on bed watching tv.    1609  Dual AVS reviewed with GABRIEL Begum. All medications reviewed individually with patient. Opportunities for questions and concerns provided. Patient to be discharged via (car, transport, ambulance). Patient's armband appropriately discarded. IV removed.

## 2020-06-29 NOTE — ANESTHESIA POSTPROCEDURE EVALUATION
Procedure(s):  LEFT HIP: REVISION TOTAL HIP REPLACEMENT ANTERIOR APPROACH WITH C-ARM (ALL COMPONENTS). general    Anesthesia Post Evaluation      Multimodal analgesia: multimodal analgesia used between 6 hours prior to anesthesia start to PACU discharge  Patient location during evaluation: PACU  Patient participation: complete - patient participated  Level of consciousness: awake  Pain management: adequate  Airway patency: patent  Anesthetic complications: no  Cardiovascular status: acceptable  Respiratory status: acceptable  Hydration status: acceptable  Post anesthesia nausea and vomiting:  none  Final Post Anesthesia Temperature Assessment:  Normothermia (36.0-37.5 degrees C)      INITIAL Post-op Vital signs:   Vitals Value Taken Time   /52 6/29/2020 10:34 AM   Temp 36.7 °C (98.1 °F) 6/29/2020  9:57 AM   Pulse 85 6/29/2020 10:41 AM   Resp 11 6/29/2020 10:41 AM   SpO2 98 % 6/29/2020 10:41 AM   Vitals shown include unvalidated device data.

## 2020-06-29 NOTE — PROGRESS NOTES
Problem: Self Care Deficits Care Plan (Adult)  Goal: *Acute Goals and Plan of Care (Insert Text)  Description: Initial Occupational Therapy Goals (6/29/2020) Within 7 day(s):    1. Patient will perform grooming standing sinkside with supervision for increased independence in ADLs. 2. Patient will perform UB dressing with supervision seated EOB for increased independence with ADLs. 3. Patient will perform LB dressing with SBA & A/E PRN for increased independence with ADLs. 4. Patient will perform all aspects of toileting with supervision for increased independence with ADLs. 5. Patient will perform LB ADLs utilizing body mechanics & adaptive strategies with 1 verbal cue for increased safety in ADLs. 6. Patient will independently apply energy conservation techniques with 1 verbal cue(s)for increased independence with ADLs. Outcome: Resolved/Met   OCCUPATIONAL THERAPY EVALUATION/DISCHARGE    Patient: Swathi Mancia (70 y.o. female)  Date: 6/29/2020  Primary Diagnosis: [facility] [T84.018A, Z96.649]  Procedure(s) (LRB):  LEFT HIP: REVISION TOTAL HIP REPLACEMENT ANTERIOR APPROACH WITH C-ARM (ALL COMPONENTS) (Left) Day of Surgery   Precautions: Fall, WBAT  PLOF: pt mod I for ADLs, living in one story home with family members    ASSESSMENT AND RECOMMENDATIONS:  Based on the objective data described below, the patient presents with LLE decreased ROM and strength affecting LE ADLs. Vitals assessed, /53, pr reporting 0/10 pain. Pt able to sit up to EOB without assist, and completed upper body dressing with supervision. Pt able to don pullover dress without assist. Pt able to thread B feet through underwear/pants without assist, and SBA when standing to pull up to waist. Patient able to utilize RLE ankle-over-knee technique and bending forward with LLE for sock donning.  Pt will require assist w/ compression hose which patient reports significant other/children can assist.  Pt SBA/supervision for bathroom mobility/toilet transfer, and pt able to void on toilet after awhile. Pt complete bladder hygiene with supervision, and hand washing standing at sink with supervision. Pt ambulated back to bed with SBA/supervision and additional time to complete, pt reporting pain increasing to 8/10. Pt left seated EOB with lunch tray, call bell/phone within reach. Education: Reviewed home safety, body mechanics, importance of moving every hour to prevent joint stiffness, role of ice for edema/pain control, Rolling Walker management/safety, and adaptive dressing techniques with patient verbalizing  understanding at this time     Skilled Occupational Therapy is not indicated at this time in this setting. Patient should continue to improve as pain and ROM/strength improves. Discharge Recommendations: Home Health  Further Equipment Recommendations for Discharge: N/A      SUBJECTIVE:   Patient stated i'm just trying to wake up, I feel sleepy.     OBJECTIVE DATA SUMMARY:     Past Medical History:   Diagnosis Date    Arthritis     Degenerative disc disease, lumbar     Failed total hip arthroplasty ([facility] [address] 6/12/2020    Joint pain     Neck pain     Psychiatric disorder     bipolar, anxiety and depression, ADHD, panic attacks     Past Surgical History:   Procedure Laterality Date    HX APPENDECTOMY      HX GYN      hysterectomy    HX HEENT  07/2016    nasal deviation-sinus    HX HIP REPLACEMENT Left     HX ORTHOPAEDIC Left 2016    decompression left hip    HX ORTHOPAEDIC Left     hip replacement     Barriers to Learning/Limitations: yes;  physical  Compensate with: visual, verbal, tactile, kinesthetic cues/model    Home Situation/Prior level of Function: pt mod I for ADLs, living in one story home, tub/shower with shower chair  Home Situation  Home Environment: Private residence  # Steps to Enter: 3  One/Two Story Residence: One story  Living Alone: No  Support Systems: Family member(s), Spouse/Significant Other/Partner  Patient Expects to be Discharged to[de-identified] Private residence  Current DME Used/Available at Home: Mov[employer] Bush, [employer], [address] [facility] chair  Tub or Shower Type: Tub/Shower combination  []  Right hand dominant   []  Left hand dominant    Cognitive/Behavioral Status:  Neurologic State: Alert  Orientation Level: Oriented X4  Cognition: Appropriate decision making; Appropriate for age attention/concentration; Appropriate safety awareness; Follows commands  Safety/Judgement: Awareness of environment; Insight into deficits    Skin: L hip incision w/ Mepilex   Edema: compression hose in place & applied ice     Coordination:  Coordination: Within functional limits  Fine Motor Skills-Upper: Left Intact; Right Intact    Gross Motor Skills-Upper: Left Intact; Right Intact    Balance:  Sitting: Intact  Standing: Intact; With support    Strength: BUE  Strength: Generally decreased, functional    Tone & Sensation:BUE  Tone: Normal  Sensation: Intact    Range of Motion:BUE  AROM: Generally decreased, functional    Functional Mobility and Transfers for ADLs:  Bed Mobility:  Supine to Sit: Supervision  Scooting: Supervision  Transfers:  Sit to Stand: Stand-by assistance   Toilet Transfer : Stand-by assistance    ADL Assessment/Intervention:  Feeding: Setup  Oral Facial Hygiene/Grooming: Setup  Bathing: Stand-by assistance  Upper Body Dressing: Supervision  Lower Body Dressing: Stand-by assistance  Toileting: Supervision     Grooming  Grooming Assistance: Stand-by assistance;Supervision  Position Performed: Standing  Washing Hands: Stand-by assistance;Supervision     Upper Body Dressing Assistance  Dressing Assistance: Supervision  Bra: Supervision  Pullover Shirt: Supervision  Lower Body Dressing Assistance  Dressing Assistance: Stand-by assistance  Underpants: Stand-by assistance  Socks: Stand-by assistance  Position Performed: Seated edge of bed  Cues: Verbal cues provided;Visual cues provided    LE Adaptive Equipment:  [x] Adaptive Equipment was not issued due patient able to complete with out use of AE and use of AE would prevent stretching needed to progress with recovery. (Pt able to complete w/ compensatory strategies and able to compensate for socks w/ clothing modifications, but will require assist with Compression hose). Toileting  Toileting Assistance: Supervision  Bladder Hygiene: Supervision  Clothing Management: Stand-by assistance    Cognitive Retraining  Safety/Judgement: Awareness of environment; Insight into deficits    Pain:  Pain level pre-treatment: 0/10  Pain level post-treatment: 8/10  Pain Intervention(s): Medication administer by Nursing (see MAR); Rest, Ice, Repositioning   Response to intervention: Nurse notified, see doc flow sheet    Activity Tolerance:   Fair. Patient able to stand ~3 minute(s). Patient able to complete ADLs with intermittent rest breaks. Patient limited by pain, strength, ROM. Patient unsteady. Please refer to the flowsheet for vital signs taken during this treatment. After treatment:   []  Patient left in no apparent distress sitting up in chair  [x]  Patient sitting on EOB  []  Patient left in no apparent distress in bed  [x]  Call bell left within reach  [x]  Nursing notified  []  Caregiver present  [x]  Ice applied  []  SCD's on while back in bed    COMMUNICATION/EDUCATION:   Communication/Collaboration:  [x]       Role of Occupational Therapy in the acute care setting. [x]      Home safety education was provided and the patient/caregiver indicated understanding. [x]      Patient/family have participated as able in goal setting and plan of care. [x]      Patient/family agree to work toward stated goals and plan of care. []      Patient understands intent and goals of therapy, but is neutral about his/her participation. []      Patient is unable to participate in plan of care at this time.     Thank you for this referral.  Woodrow Messina, OTR/L  Time Calculation: 39 mins    [place] Complexity: History: MEDIUM Complexity : Expanded review of history including physical, cognitive and psychosocial  history ; Examination: LOW Complexity : 1-3 performance deficits relating to physical, cognitive , or psychosocial skils that result in activity limitations and / or participation restrictions ;    Decision Making:LOW Complexity : No comorbidities that affect functional and no verbal or physical assistance needed to complete eval tasks

## 2020-06-29 NOTE — INTERVAL H&P NOTE
Update History & Physical 
 
The Patient's History and Physical of June 24,2020,  
  was reviewed with the patient and I examined the patient. There was no change. The surgical site was confirmed by the patient and me. Plan:  The risk, benefits, expected outcome, and alternative to the recommended procedure have been discussed with the patient. Patient understands and wants to proceed with the procedure.  
 
Electronically signed by Lisset Rm MD on 6/29/2020 at 7:13 AM

## 2020-06-29 NOTE — PERIOP NOTES
TRANSFER - OUT REPORT:    Verbal report given to GABRIEL Bonilla on Edis Shi  being transferred to [address] [place] [address] (unit) for routine post - op       Report consisted of patients Situation, Background, Assessment and   Recommendations(SBAR). Information from the following report(s) SBAR was reviewed with the receiving nurse. Lines:   Peripheral IV 06/29/20 Right Hand (Active)   Site Assessment Clean, dry, & intact 6/29/2020 10:22 AM   Phlebitis Assessment 0 6/29/2020 10:22 AM   Infiltration Assessment 0 6/29/2020 10:22 AM   Dressing Status Clean, dry, & intact 6/29/2020 10:22 AM   Dressing Type Tape 6/29/2020 10:22 AM   Hub Color/Line Status Infusing 6/29/2020 10:22 AM        Opportunity for questions and clarification was provided.       Patient transported with:   Registered Nurse

## 2020-06-29 NOTE — ANESTHESIA PREPROCEDURE EVALUATION
Relevant Problems   No relevant active problems       Anesthetic History   No history of anesthetic complications            Review of Systems / Medical History  Patient summary reviewed and pertinent labs reviewed    Pulmonary  Within defined limits                 Neuro/Psych         Psychiatric history     Cardiovascular                  Exercise tolerance: >4 METS     GI/Hepatic/Renal  Within defined limits              Endo/Other  Within defined limits           Other Findings              Physical Exam    Airway  Mallampati: I  TM Distance: 4 - 6 cm  Neck ROM: normal range of motion   Mouth opening: Normal     Cardiovascular    Rhythm: regular  Rate: normal         Dental  No notable dental hx       Pulmonary  Breath sounds clear to auscultation               Abdominal  GI exam deferred       Other Findings            Anesthetic Plan    ASA: 1  Anesthesia type: general          Induction: Intravenous  Anesthetic plan and risks discussed with: Patient

## 2020-06-29 NOTE — PERIOP NOTES
Dr. Lavell Hunt at bedside requested out note informed that bp is our of range in regard to induction bp was told okay with current bp and can go to floor. Asymptomatic denies pain or discomfort condition stable.

## 2020-06-29 NOTE — DISCHARGE INSTRUCTIONS
[address] [place] [address] Sports Medicine   Patient Discharge Instructions    Elton Huston / [id number] : 1970    Admitted (Not on file) Discharged: 2020     IF YOU HAVE ANY PROBLEMS ONCE YOU ARE  [facility]:   Main office number: [phone]    Your follow up appointment to see either Dr. Valentine Cabrera PA-C, or Yury Lara PA-C as scheduled in 2 weeks. If you are unsure of your appointment date call the office at [phone]. Medication Instructions     · Resume your home medictions as directed, you may have directed not to resume supplements until after your follow up. · A prescription for pain medication has been given   · It is important that you take the medication exactly as they are prescribed. · Keep your medication in the bottles provided by the pharmacist and keep a list of the medication names, dosages, and times to be taken in your wallet. · Do not take other medications without consulting your doctor. What to do at [address] [place] [address] Ave your prehospital diet. If you have excessive nausea or vomitting call your doctor's office. Be sure to maintain adequate fluid intake. Some pain medications may cause constipation. Remember to drink fluids, stay as active as possible, and eat plenty of fiber-rich foods. Begin In-Home Physical Therapy; 3 times a week to work on gait training, range of motion, strengthening, and weight bearing exercises as tolerable. Continue to use your walker when walking. Avoid twisting and posterior leg extensions. Patient may shower. Wrap incision with plastic wrap/covering to prevent incision from getting wet. Avoid complete immersion. YOUR DRESSING SHOULD BE CHANGED BY YOUR HOME HEALTH NURSE 3-5 DAYS AFTER SURGERY.           When to Call    - Call if you have a temperature greater then 101  - Unable to keep food down  - Are unable to bear any wieght   - Need a pain medication refill Information obtained by :  I understand that if any problems occur once I am at home I am to contact my physician. I understand and acknowledge receipt of the instructions indicated above.                                                                                                                                            Physician's or R.N.'s Signature                                                                  Date/Time                                                                                                                                              Patient or Representative Signature                                                          Date/Time

## 2020-06-29 NOTE — PROGRESS NOTES
Problem: Mobility Impaired (Adult and Pediatric)  Goal: *Acute Goals and Plan of Care (Insert Text)  Description: PT goals to be met in 1 day:  1. Patient will be able to perform supine<>sit SBA for improved transfers at home. 2.  Patient will be able to perform sit<>stand SBA for increased ability to transfer at home safely. 3.  Patient will be able to participate in gt training >100' w/ RW, WBAT, GB and CGA/SBA for improved ability to maneuver in home upon d/c.  4.  Patient will be able to perform box step/stair training using step to pattern, B/U rail and CGA to obtain safe entry into home upon d/c.  5.  Patient will be educated regarding HEP per MD protocol for optimal AROM/strength outcomes. Note:   PHYSICAL THERAPY EVALUATION & DISCHARGE    Patient: Benny Jacome (76 y.o. female)  Date: 6/29/2020  Primary Diagnosis: [facility] [T84.018A, Z96.649]  Procedure(s) (LRB):  LEFT HIP: REVISION TOTAL HIP REPLACEMENT ANTERIOR APPROACH WITH C-ARM (ALL COMPONENTS) (Left) Day of Surgery   Precautions:   Fall, WBAT    ASSESSMENT AND RECOMMENDATIONS:  Based on the objective data described below, the patient presents with decreased mobility in regards to bed mobility, transfers, gt quality and tolerance, balance, stair negotiation and safety due to L hip surgery. Decreased AROM of L hip, dec strength of L hip, pain in L hip also impacting pt functional mobility. Pt rating pain on numerical pain scale pre/post and during session 8/10. Pt ed regarding mobility safety, WB, environmental safety and home safe techniques. Pt able to perform supine<>sit w/ S and sit<>stand w/ S. Safety vc required throughout session to reinforce safety. Pt able to participate in gt training using RW, GB, WBAT and SBA/S w/ antalgic gt pattern. Pt was able to participate in stair training using step to pattern, B rails and CGA. Pt has met criteria for transition to [address] Answered questions by pt.   Pt left sitting EOB w/ all needs within reach and ice pack to L hip. Nurse aware. Recommend HHPT/Outpatient PT upon hospital d/c. Skilled acute physical therapy is not indicated at this time. Discharge Recommendations: Home Health  Further Equipment Recommendations for Discharge: N/A      SUBJECTIVE:   Patient stated I hurt but I am so happy he did such a good job.     OBJECTIVE DATA SUMMARY:     Past Medical History:   Diagnosis Date    Arthritis     Degenerative disc disease, lumbar     Failed total hip [facility] ([facility] 6/12/2020    Joint pain     Neck pain     Psychiatric disorder     bipolar, anxiety and depression, ADHD, panic attacks     Past Surgical History:   Procedure Laterality Date    HX APPENDECTOMY      HX GYN      hysterectomy    HX HEENT  07/2016    nasal deviation-sinus    HX HIP REPLACEMENT Left     HX ORTHOPAEDIC Left 2016    decompression left hip    HX ORTHOPAEDIC Left     hip replacement     Barriers to Learning/Limitations: None  Compensate with: visual, verbal, tactile, kinesthetic cues/model  Prior Level of Function/Home Situation:   Home Situation  Home Environment: Private residence  # Steps to Enter: 3  Rails to Enter: Yes  Hand Rails : Bilateral  One/Two Story Residence: One story  Living Alone: No  Support Systems: Family member(s), Spouse/Significant Other/Partner  Patient Expects to be Discharged to[de-identified] Private residence  Current DME Used/Available at Home: Walker, rolling, [address] chair  Tub or Shower Type: Tub/Shower combination  Critical Behavior:  Neurologic State: Alert; Appropriate for age  Orientation Level: Oriented X4  Cognition: Appropriate decision making; Appropriate for age attention/concentration; Appropriate safety awareness; Follows commands  Safety/Judgement: Awareness of environment  Psychosocial  Patient Behaviors: Calm; Cooperative  Purposeful Interaction: Yes  Pt Identified Daily Priority: Clinical issues (comment)  Caritas Process: Establish trust;Teaching/learning  Caring Interventions: Therapeutic modalities  Skin Condition/Temp: Dry;Warm  Skin Integrity: Incision (comment)(L hip)  Skin Integumentary  Skin Color: Appropriate for ethnicity  Skin Condition/Temp: Dry;Warm  Skin Integrity: Incision (comment)(L hip)  Strength:    Strength: Generally decreased, functional  Tone & Sensation:   Tone: Normal  Sensation: Intact  Range Of Motion:  AROM: Generally decreased, functional  Functional Mobility:  Bed Mobility:  Supine to Sit: Supervision  Scooting: Supervision  Transfers:  Sit to Stand: Stand-by assistance  Stand to Sit: Stand-by assistance  Balance:   Sitting: Intact  Standing: Intact; With support  Ambulation/Gait Training:  Distance (ft): 150 Feet (ft)  Assistive Device: Walker, rolling;Gait belt  Ambulation - Level of Assistance: Stand-by assistance  Gait Abnormalities: Antalgic;Decreased step clearance  Left Side Weight Bearing: As tolerated  Base of Support: Shift to right  Stance: Left decreased  Speed/Petra: Slow  Step Length: Left shortened;Right shortened  Swing Pattern: Left asymmetrical;Right asymmetrical  Interventions: Safety awareness training; Tactile cues; Verbal cues; Visual/Demos  Therapeutic Exercises:   Pain:  Pain Scale 1: Numeric (0 - 10)  Pain Intensity 1: 8  Pain Location 1: Hip  Pain Orientation 1: Left  Activity Tolerance:   Fair   Please refer to the flowsheet for vital signs taken during this treatment. After treatment:   [x]         Patient left in no apparent distress sitting up EOB  []         Patient left in no apparent distress in bed  [x]         Call bell left within reach  [x]         Nursing notified  []         Caregiver present  []         Bed alarm activated    COMMUNICATION/EDUCATION:   [x]         Fall prevention education was provided and the patient/caregiver indicated understanding. [x]         Patient/family have participated as able in goal setting and plan of care.   [x]         Patient/family agree to work toward stated goals and plan of care. []         Patient understands intent and goals of therapy, but is neutral about his/her participation. []         Patient is unable to participate in goal setting and plan of care.     Thank you for this referral.  Margorie Aschoff, PT   Time Calculation: 28 mins       Eval Complexity: History: HIGH Complexity :3+ comorbidities / personal factors will impact the outcome/ POC Exam:MEDIUM Complexity : 3 Standardized tests and measures addressing body structure, function, activity limitation and / or participation in recreation  Presentation: LOW Complexity : Stable, uncomplicated  Clinical Decision Making:Low Complexity    Overall Complexity:LOW

## 2020-06-29 NOTE — BRIEF OP NOTE
Brief Postoperative Note    Patient: Shoaib Comer  YOB: 1970  MRN: [id number]    Date of Procedure: 6/29/2020     Pre-Op Diagnosis: LEFT HIP PROSTHETIC JOINT IMPLANT FAILURE    Post-Op Diagnosis: Same as preoperative diagnosis. Procedure(s):  LEFT HIP: REVISION TOTAL HIP REPLACEMENT ANTERIOR APPROACH WITH C-ARM (ALL COMPONENTS)    Surgeon(s):  Doreen Allen MD    Surgical Assistant: Physician Assistant: YAYA Wolf    Anesthesia: General     Estimated Blood Loss (mL): 395     Complications: None    Specimens:   ID Type Source Tests Collected by Time Destination   1 :  Wound Hip, left CULTURE, ANAEROBIC, CULTURE, WOUND W Michelle Miller MD 6/29/2020 9727 Microbiology        Implants:   Implant Name Type Inv.  Item Serial No.  Lot No. LRB No. Used Action   INSERT ACET CUP X3 [id number] -- RESTORATION ADM - [id number]  INSERT ACET CUP X3 [id number] -- RESTORATION ADM  [employer] HOW [id number] Left 1 Implanted   STEM HIP SHRT CITATION LT SZ 4 --  - [id number]  STEM HIP SHRT CITATION LT SZ 4 --   [employer] HOW [id number] Left 1 Implanted   HEAD FEM DELT V40 0MM NK 28MM -- V40 BIOLOX - [id number]  HEAD FEM DELT V40 0MM NK 28MM -- V40 BIOLOX  [employer] HOW [id number] Left 1 Implanted       Drains: * No LDAs found *    Findings: loose stem and aquired lld    Electronically Signed by Janett Riedel, MD on 6/29/2020 at 11:18 AM

## 2020-06-29 NOTE — PERIOP NOTES
Pt. Used restroom in pre-op area with assistance. Patient placed on Al Paws for a minimum of 30 min in  Preop.

## 2020-06-29 NOTE — ROUTINE PROCESS
[facility] TRANSFER - IN REPORT: 
 
Verbal report received from Nenita Edward on Fabrizio Apgar  being received from PACU for routine post - op Report consisted of patients Situation, Background, Assessment and  
Recommendations(SBAR). Information from the following report(s) SBAR, Kardex, OR Summary, Intake/Output, MAR, and Recent Results was reviewed with the receiving nurse. Opportunity for questions and clarification was provided. Assessment will be completed upon patients arrival to unit and care assumed.

## 2020-06-29 NOTE — PERIOP NOTES
Verbal hand off at the bedside with Bang Vasquez provided opportunity for questions. Personal belongings brought over to room cell phone and purse with security.

## 2020-06-29 NOTE — PROGRESS NOTES
PT eval received and chart reviewed. Pt in middle of eating meal.  Will return later as pt schedule allows.

## 2020-06-29 NOTE — H&P
[address] [facility] [address] Medicine  History and Physical Exam    Patient: Thang Byrne MRN: [id number]  SSN: [id number]    YOB: 1970  Age: 45 y.o. Sex: female      Subjective:      Chief Complaint: Left hip pain    History of Present Illness:  Patient complains of left hip pain and difficulty ambulating, which has progressed over the past several months. She is status post left THR by Dr. Jarred Fortune in 2017. X-rays showed loose THR components of the joint. The patient's pain has persisted and progressed despite conservative treatments and therapies. The patient has been previously treated with nsaids. The patient has at this time opted for surgical intervention. Past Medical History:   Diagnosis Date    Arthritis     Degenerative disc disease, lumbar     [facility]    Joint pain     Neck pain     Psychiatric disorder     bipolar, anxiety and depression, ADHD, panic attacks     Past Surgical History:   Procedure Laterality Date    HX APPENDECTOMY      HX GYN      hysterectomy    HX HEENT  07/2016    nasal deviation-sinus    HX HIP REPLACEMENT Left     HX ORTHOPAEDIC Left 2016    decompression left hip    HX ORTHOPAEDIC Left     hip replacement     Social History     Occupational History    Not on file   Tobacco Use    Smoking status: Never Smoker    Smokeless tobacco: Never Used   Substance and Sexual Activity    Alcohol use: Yes     Alcohol/week: 3.0 - 10.0 standard drinks     Types: 3 - 10 Glasses of wine per week    Drug use: Yes     Types: Marijuana    Sexual activity: Not Currently     Partners: Female     Prior to Admission medications    Medication Sig Start Date End Date Taking? Authorizing Provider   oxyCODONE-acetaminophen (PERCOCET) 5-325 mg per tablet Take 1 Tab by mouth every four (4) hours as needed for Pain for up to 7 days. Max Daily Amount: 6 Tabs.  6/29/20 7/6/20 Yes Ronaldo Sun PA   cefadroxil (DURICEF) 500 mg capsule Take 1 Cap by mouth two (2) times a day for 5 days. 6/29/20 7/4/20 Yes Eleuterio Sun PA   aspirin 81 mg chewable tablet Take 1 Tab by mouth two (2) times a day for 21 days. 6/29/20 7/20/20 Yes Eleuterio Sun PA   meloxicam (MOBIC) 7.5 mg tablet Take 1 Tab by mouth two (2) times a day for 14 days. 6/29/20 7/13/20 Yes Eleuterio Sun PA   multivitamin (ONE A DAY) tablet Take 1 Tab by mouth daily. Yes Provider, Historical   risperiDONE (RisperDAL) 3 mg tablet Take 3 mg by mouth nightly. Yes Provider, Historical   risperiDONE (RisperDAL) 1 mg tablet Take 1 mg by mouth daily. Yes Provider, Historical   citalopram (CELEXA) 40 mg tablet Take 40 mg by mouth daily. Yes Provider, Historical   estradioL (CLIMARA) 0.1 mg/24 hr 1 Patch by TransDERmal route Every Saturday. Yes Provider, Historical   fluticasone (FLONASE) 50 mcg/actuation nasal spray 2 Sprays by Both Nostrils route daily. Yes Provider, Historical       Allergies: Allergies   Allergen Reactions    Other Food Anaphylaxis     Peaches- closes throat    Nectarine Itching        Review of Systems:  A comprehensive review of systems was negative except for that written in the History of Present Illness. Objective:       Physical Exam:  HEENT: Normocephalic, atraumatic  Lungs:  Clear to auscultation  Heart:   Regular rate and rhythm  Abdomen: Soft  Extremities:  Pain with range of motion of the left hip. Passive flexion  degrees,                       passive internal rotation 0-10 degrees, with pain throughout ROM,                        passive external rotation 10-20 degrees with pain at the arc of motion. Antalgic gait noted. 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 Plan:       Proceed with scheduled REVISION LEFT TOTAL HIP ARTHROPLASTY. The various methods of treatment have been discussed with the patient and family.  After consideration of risks, benefits, and other options for treatment, the patient has consented to surgical interventions. Questions were answered and preoperative teaching was done by Dr Juan Alberto Bella. The patient would benefit from the use of Vancomycin for antibiotic prophylaxis due to increased risk of infection.     Signed By: YAYA Hendricks     June 29, 2020

## 2020-06-29 NOTE — PERIOP NOTES
TRANSFER - IN REPORT:    Verbal report received from ORN & CRNA on Angel Forte  being received from OR (unit) for routine post - op      Report consisted of patients Situation, Background, Assessment and   Recommendations(SBAR). Information from the following report(s) SBAR was reviewed with the receiving nurse. Opportunity for questions and clarification was provided. Assessment completed upon patients arrival to unit and care assumed.

## 2020-06-29 NOTE — DISCHARGE SUMMARY
[facility]   [address] [facility] [place] [address]     DISCHARGE SUMMARY     PATIENT: Wardell Apgar     MRN: [id number]   ADMIT DATE: 2020   [facility]   DISCHARGE DATE:      ATTENDING: Norbert Mistry MD   DICTATING: YAYA Montano     ADMISSION DIAGNOSIS: Failed total hip arthroplasty ([facility] [T84.018A, Z96.649]    DISCHARGE DIAGNOSIS: Status post REVISION LEFT TOTAL HIP ARTHROPLASTY    HISTORY OF PRESENT ILLNESS: The patient is a 45y.o. year-old female   with ongoing left hip pain secondary to failed total hip athroplasty. The patient's pain has persisted and progressed despite conservative treatments and therapies. The patient has at this time opted for surgical intervention. PAST MEDICAL HISTORY:   Past Medical History:   Diagnosis Date    Arthritis     Degenerative disc disease, lumbar     Failed total hip arthroplasty ([facility] 2020    Joint pain     Neck pain     Psychiatric disorder     bipolar, anxiety and depression, ADHD, panic attacks       PAST SURGICAL HISTORY:   Past Surgical History:   Procedure Laterality Date    HX APPENDECTOMY      HX GYN      hysterectomy    HX HEENT  2016    nasal deviation-sinus    HX HIP REPLACEMENT Left     HX ORTHOPAEDIC Left 2016    decompression left hip    HX ORTHOPAEDIC Left     hip replacement       ALLERGIES:   Allergies   Allergen Reactions    Other Food Anaphylaxis     Peaches- closes throat    Nectarine Itching        CURRENT MEDICATIONS:  A list of medications prior to the time of admission include:  Prior to Admission medications    Medication Sig Start Date End Date Taking? Authorizing Provider   oxyCODONE-acetaminophen (PERCOCET) 5-325 mg per tablet Take 1 Tab by mouth every four (4) hours as needed for Pain for up to 7 days. Max Daily Amount: 6 Tabs. 20 Yes Eleuterio Sun PA   cefadroxil (DURICEF) 500 mg capsule Take 1 Cap by mouth two (2) times a day for 5 days. 6/29/20 7/4/20 Yes Eleuterio Sun PA   aspirin 81 mg chewable tablet Take 1 Tab by mouth two (2) times a day for 21 days. 6/29/20 7/20/20 Yes Eleuterio Sun PA   meloxicam (MOBIC) 7.5 mg tablet Take 1 Tab by mouth two (2) times a day for 14 days. 6/29/20 7/13/20 Yes Eleuterio Sun PA   multivitamin (ONE A DAY) tablet Take 1 Tab by mouth daily. Yes Provider, Historical   risperiDONE (RisperDAL) 3 mg tablet Take 3 mg by mouth nightly. Yes Provider, Historical   risperiDONE (RisperDAL) 1 mg tablet Take 1 mg by mouth daily. Yes Provider, Historical   citalopram (CELEXA) 40 mg tablet Take 40 mg by mouth daily. Yes Provider, Historical   estradioL (CLIMARA) 0.1 mg/24 hr 1 Patch by TransDERmal route Every Saturday. Yes Provider, Historical   fluticasone (FLONASE) 50 mcg/actuation nasal spray 2 Sprays by Both Nostrils route daily. Yes Provider, Historical       FAMILY HISTORY:   Family History   Problem Relation Age of Onset    Heart Disease Mother     Hypertension Mother     Heart Disease Maternal Grandmother        SOCIAL HISTORY:   Social History     Socioeconomic History    Marital status: SINGLE     Spouse name: Not on file    Number of children: Not on file    Years of education: Not on file    Highest education level: Not on file   Tobacco Use    Smoking status: Never Smoker    Smokeless tobacco: Never Used   Substance and Sexual Activity    Alcohol use: Yes     Alcohol/week: 3.0 - 10.0 standard drinks     Types: 3 - 10 Glasses of wine per week    Drug use: Yes     Types: Marijuana    Sexual activity: Not Currently     Partners: Female       REVIEW OF SYSTEMS: All review of systems are negative. PHYSICAL EXAMINATION: For a detailed physical exam, please refer to the patient's chart. HOSPITAL COURSE: The patient was taken to surgery the day of admission. she underwent revision left total hip replacement via the anterior approach. Operative course was benign.  Estimated blood loss approximately 300 cc. The patient was taken to the PACU in stable condition and was later taken to the floor in stable condition. Post-op Day of surgery, patient has done very well.  she has had little to no pain. she had been cleared by physical therapy with stair training. she was placed on Aspirin for DVT prophylaxis. her vitals have remained stable. she has also remained hemodynamically stable. The patient has been recommended for discharge home. DISCHARGE INSTRUCTIONS: The patient is to be discharged home. she is to continue on her prior medications per the medication reconciliation form, to which we will add:         1)  Aspirin 81 mg; 1 tablet p.o. b.i.d. X 21 days         2)  Mobic 7.5 mg; 1 tablet p.o. BID x 14 days           3)  Percocet 5/325 mg; 1 tablets p.o. every 4 hours p.r.n. for pain         4)  Duricef 500 mg; 1 tablet p.o. every 12 hours x 5 days    The patient is to continue at home with home physical therapy 3 times a week to work on gait training, range of motion, strengthening, and weightbearing exercises as tolerated on her left lower extremity. The patient is to progress from a walker to a cane to complete total weightbearing as tolerable. The patient is to continue to keep her incision dry. The patient is to followup with Dr. Aldon Saint, Elvia Marie PA-C, and/or [facility] in the office approximately 10-14 days status post for x-rays and further evaluation.       Barbara Austin  3/93/111572:89 AM

## 2020-06-30 ENCOUNTER — HOME CARE VISIT (OUTPATIENT)
Dept: SCHEDULING | Facility: HOME HEALTH | Age: 50
End: 2020-06-30
Payer: MEDICAID

## 2020-06-30 VITALS
RESPIRATION RATE: 15 BRPM | SYSTOLIC BLOOD PRESSURE: 124 MMHG | OXYGEN SATURATION: 96 % | HEART RATE: 70 BPM | TEMPERATURE: 98.1 F | DIASTOLIC BLOOD PRESSURE: 72 MMHG

## 2020-06-30 PROCEDURE — 400013 HH SOC

## 2020-06-30 PROCEDURE — G0151 HHCP-SERV OF PT,EA 15 MIN: HCPCS

## 2020-06-30 NOTE — PROGRESS NOTES
0791 6/30/2020- pt discharged last evening, on call CM not paged. Called pt [phone], left , awaiting call back. 0- spoke with pt, she has been seen by ADA BRENNAN [facility] today, and would like to continue with them.

## 2020-06-30 NOTE — OP NOTES
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 PROCEDURE PERFORMED:  Revision of left total hip arthroplasty. SURGEON:  Kaylyn Cherry MD    [facility]  [facility]    ANESTHESIA:  General.    COMPLICATIONS:  None. SPECIMENS REMOVED:  None. IMPLANTS:  Size 4 Mccloud anatomic stem, a 28-mm ceramic head ball with +0 neck length, and 28 x 48 dual mobility liner. ESTIMATED BLOOD LOSS:  300 mL. PROCEDURE:  After the patient was brought into the operating suite, she was effectively anesthetized using general anesthesia and then transferred to the [facility] table and secured in standard fashion. Her left hip was then prepped and draped in a normal sterile orthopedic fashion. She was given appropriate antibiotic IV preoperatively. After proper site identification, a direct anterior approach to the hip was performed by beginning a skin incision just distal lateral to the ASIS and extending along the direction of the tensor muscle for about 10-12 centimeters. This was done through skin and subcutaneous tissue. The fascia of the tensor was identified and incised to the length of the incision. The tensor was then peeled off the fascia and the Smith-Putnam interval was then developed down to the joint. The ascending branch of the lateral femoral circumflex vessel lesion was identified and ligated and cauterized. The cobra retractors were placed extracapsularly around the neck area of the femur and a inverted T capsulotomy was then performed. The superior aspect of the capsule was then excised.   The inferior medial capsule was tagged with a suture. The femur was externally rotated about 100 degrees and the remainder of pubofemoral ligament and her inferior capsule was then released off the femur down to the lesser trochanter. The hip was then dislocated with a combination of traction and external rotation, and the head ball was removed from the stem. The trunnion was well preserved. The socket cup appeared to be well fixed, slight retroversion but not bad and certainly not loose. At this point, the [place] hook was placed around the femur and the leg was extended and externally rotated and adducted to deliver the proximal femur in to the wound. Using a combination of a rongeur and electrocautery, the proximal femur was exposed, and the high-speed bur was used to remove bone that had overgrown in the area of the trochanteric recess. The stem was identified and using a pencil tip bur and osteotomes, the proximal aspect of the stem was freed from bone. It did not appear to be very well fixed. The screw and stem  was then screwed into the femur and using a slap hammer, the femoral component was easily removed. At this point, we had pretty good metaphyseal bone left. We decided to go with a proximal metaphyseal filling type stem due to her young age. At this point, she was broached up to size four for the [place] anatomic stem with excellent rotational stability. Trial reduction was performed with a zero head and the 28 x 48 dual mobility liner. She actually had excellent functional stability throughout the range of motion, and her leg length was now perhaps 2 or 3 mm long on the left side, which was a significant improvement from preoperatively. We decided to go with those components. At this point, the hip was dislocated. The femur was exposed by using the [place] hook and dropping leg once again into extension, external rotation, and adduction.   The canal was irrigated, and the actual implant was impacted in place resting right at its broached position with good stable fixation. The head ball and dual mobility liner were then assembled on the back table and then placed on the trunnion which was dried with a lap pad and then the hip was reduced, once again excellent rotational stability and near equalization of leg lengths. The hip was then irrigated copiously with the pulse lavage system and a closed in layers with the fascia of the tensor closed with #1 Vicryl in a running type stitch. Subcutaneous tissue was closed with 2-0 Vicryl simple buried stitch and the skin was closed with ZipLine. Mepilex dressing was applied. She tolerated this well, was transferred to the bed and taken to recovery room extubated in stable condition. All sponge, needle counts were correct.       MD MAURICIO [email or web address]  D:  06/29/2020 16:14  T:  06/30/2020 0:46  JOB #:  [id number]

## 2020-07-01 ENCOUNTER — HOME CARE VISIT (OUTPATIENT)
Dept: SCHEDULING | Facility: HOME HEALTH | Age: 50
End: 2020-07-01
Payer: MEDICAID

## 2020-07-01 VITALS
RESPIRATION RATE: 12 BRPM | OXYGEN SATURATION: 98 % | TEMPERATURE: 98.1 F | DIASTOLIC BLOOD PRESSURE: 79 MMHG | HEART RATE: 86 BPM | SYSTOLIC BLOOD PRESSURE: 126 MMHG

## 2020-07-01 PROCEDURE — A6213 FOAM DRG >16<=48 SQ IN W/BDR: HCPCS

## 2020-07-01 PROCEDURE — G0299 HHS/HOSPICE OF RN EA 15 MIN: HCPCS

## 2020-07-01 PROCEDURE — G0157 HHC PT ASSISTANT EA 15: HCPCS

## 2020-07-02 ENCOUNTER — HOME CARE VISIT (OUTPATIENT)
Dept: HOME HEALTH SERVICES | Facility: HOME HEALTH | Age: 50
End: 2020-07-02
Payer: MEDICAID

## 2020-07-03 ENCOUNTER — HOME CARE VISIT (OUTPATIENT)
Dept: HOME HEALTH SERVICES | Facility: HOME HEALTH | Age: 50
End: 2020-07-03
Payer: MEDICAID

## 2020-07-04 ENCOUNTER — HOME CARE VISIT (OUTPATIENT)
Dept: SCHEDULING | Facility: HOME HEALTH | Age: 50
End: 2020-07-04
Payer: MEDICAID

## 2020-07-04 PROCEDURE — G0157 HHC PT ASSISTANT EA 15: HCPCS

## 2020-07-06 ENCOUNTER — HOME CARE VISIT (OUTPATIENT)
Dept: SCHEDULING | Facility: HOME HEALTH | Age: 50
End: 2020-07-06
Payer: MEDICAID

## 2020-07-06 VITALS
HEART RATE: 96 BPM | DIASTOLIC BLOOD PRESSURE: 70 MMHG | SYSTOLIC BLOOD PRESSURE: 124 MMHG | TEMPERATURE: 97.9 F | OXYGEN SATURATION: 96 % | RESPIRATION RATE: 16 BRPM

## 2020-07-06 PROCEDURE — G0157 HHC PT ASSISTANT EA 15: HCPCS

## 2020-07-06 PROCEDURE — G0299 HHS/HOSPICE OF RN EA 15 MIN: HCPCS

## 2020-07-08 ENCOUNTER — HOME CARE VISIT (OUTPATIENT)
Dept: SCHEDULING | Facility: HOME HEALTH | Age: 50
End: 2020-07-08
Payer: MEDICAID

## 2020-07-08 VITALS
HEART RATE: 72 BPM | DIASTOLIC BLOOD PRESSURE: 80 MMHG | RESPIRATION RATE: 14 BRPM | OXYGEN SATURATION: 95 % | DIASTOLIC BLOOD PRESSURE: 68 MMHG | HEART RATE: 93 BPM | RESPIRATION RATE: 12 BRPM | HEART RATE: 87 BPM | TEMPERATURE: 98.7 F | TEMPERATURE: 97.5 F | SYSTOLIC BLOOD PRESSURE: 116 MMHG | OXYGEN SATURATION: 96 % | DIASTOLIC BLOOD PRESSURE: 70 MMHG | OXYGEN SATURATION: 97 % | TEMPERATURE: 98.5 F | SYSTOLIC BLOOD PRESSURE: 143 MMHG | SYSTOLIC BLOOD PRESSURE: 116 MMHG | RESPIRATION RATE: 12 BRPM

## 2020-07-08 PROCEDURE — G0157 HHC PT ASSISTANT EA 15: HCPCS

## 2020-07-10 ENCOUNTER — HOME CARE VISIT (OUTPATIENT)
Dept: SCHEDULING | Facility: HOME HEALTH | Age: 50
End: 2020-07-10
Payer: MEDICAID

## 2020-07-10 PROCEDURE — G0157 HHC PT ASSISTANT EA 15: HCPCS

## 2020-07-11 ENCOUNTER — HOME CARE VISIT (OUTPATIENT)
Dept: HOME HEALTH SERVICES | Facility: HOME HEALTH | Age: 50
End: 2020-07-11
Payer: MEDICAID

## 2020-07-13 ENCOUNTER — HOME CARE VISIT (OUTPATIENT)
Dept: HOME HEALTH SERVICES | Facility: HOME HEALTH | Age: 50
End: 2020-07-13
Payer: MEDICAID

## 2020-07-13 ENCOUNTER — HOME CARE VISIT (OUTPATIENT)
Dept: SCHEDULING | Facility: HOME HEALTH | Age: 50
End: 2020-07-13
Payer: MEDICAID

## 2020-07-13 VITALS
SYSTOLIC BLOOD PRESSURE: 112 MMHG | RESPIRATION RATE: 13 BRPM | OXYGEN SATURATION: 98 % | TEMPERATURE: 98.2 F | DIASTOLIC BLOOD PRESSURE: 68 MMHG | HEART RATE: 82 BPM

## 2020-07-13 LAB
BACTERIA SPEC CULT: NORMAL
BACTERIA SPEC CULT: NORMAL
GRAM STN SPEC: NORMAL
GRAM STN SPEC: NORMAL
SERVICE CMNT-IMP: NORMAL
SERVICE CMNT-IMP: NORMAL

## 2020-07-13 PROCEDURE — G0151 HHCP-SERV OF PT,EA 15 MIN: HCPCS

## 2020-07-13 PROCEDURE — G0299 HHS/HOSPICE OF RN EA 15 MIN: HCPCS

## 2020-07-14 VITALS
OXYGEN SATURATION: 95 % | DIASTOLIC BLOOD PRESSURE: 70 MMHG | SYSTOLIC BLOOD PRESSURE: 124 MMHG | RESPIRATION RATE: 16 BRPM | HEART RATE: 90 BPM | TEMPERATURE: 97.9 F

## 2020-07-19 VITALS
TEMPERATURE: 98.6 F | DIASTOLIC BLOOD PRESSURE: 60 MMHG | HEART RATE: 102 BPM | OXYGEN SATURATION: 96 % | SYSTOLIC BLOOD PRESSURE: 118 MMHG | RESPIRATION RATE: 17 BRPM

## 2022-03-18 PROBLEM — M87.052 AVASCULAR NECROSIS OF HIP, LEFT (HCC): Status: ACTIVE | Noted: 2017-10-26

## 2022-03-19 PROBLEM — M54.16 RADICULOPATHY, LUMBAR REGION: Status: ACTIVE | Noted: 2020-01-30

## 2022-03-20 PROBLEM — T84.018A FAILED TOTAL HIP ARTHROPLASTY (HCC): Status: ACTIVE | Noted: 2020-06-12

## 2022-03-20 PROBLEM — Z96.649 FAILED TOTAL HIP ARTHROPLASTY (HCC): Status: ACTIVE | Noted: 2020-06-12

## 2024-04-30 ENCOUNTER — OFFICE VISIT (OUTPATIENT)
Age: 54
End: 2024-04-30
Payer: MEDICAID

## 2024-04-30 VITALS — BODY MASS INDEX: 33.99 KG/M2 | WEIGHT: 180 LBS | HEIGHT: 61 IN

## 2024-04-30 DIAGNOSIS — M54.50 CHRONIC BILATERAL LOW BACK PAIN WITHOUT SCIATICA: Primary | ICD-10-CM

## 2024-04-30 DIAGNOSIS — M70.62 GREATER TROCHANTERIC BURSITIS OF LEFT HIP: ICD-10-CM

## 2024-04-30 DIAGNOSIS — G89.29 CHRONIC BILATERAL LOW BACK PAIN WITHOUT SCIATICA: Primary | ICD-10-CM

## 2024-04-30 DIAGNOSIS — Z96.642 HISTORY OF LEFT HIP REPLACEMENT: ICD-10-CM

## 2024-04-30 PROCEDURE — 99204 OFFICE O/P NEW MOD 45 MIN: CPT | Performed by: PHYSICAL MEDICINE & REHABILITATION

## 2024-04-30 RX ORDER — ESTRADIOL 0.5 MG/1
TABLET ORAL
COMMUNITY

## 2024-04-30 RX ORDER — DEXTROAMPHETAMINE SACCHARATE, AMPHETAMINE ASPARTATE MONOHYDRATE, DEXTROAMPHETAMINE SULFATE AND AMPHETAMINE SULFATE 7.5; 7.5; 7.5; 7.5 MG/1; MG/1; MG/1; MG/1
30 CAPSULE, EXTENDED RELEASE ORAL DAILY
COMMUNITY
Start: 2024-03-14

## 2024-04-30 RX ORDER — AMLODIPINE BESYLATE 10 MG/1
10 TABLET ORAL DAILY
COMMUNITY
Start: 2024-03-04 | End: 2024-04-30

## 2024-04-30 RX ORDER — METHOCARBAMOL 750 MG/1
750 TABLET, FILM COATED ORAL DAILY PRN
COMMUNITY

## 2024-04-30 RX ORDER — AMLODIPINE BESYLATE 5 MG/1
5 TABLET ORAL DAILY
COMMUNITY
Start: 2024-02-06

## 2024-04-30 RX ORDER — ESTRADIOL 0.05 MG/D
PATCH, EXTENDED RELEASE TRANSDERMAL
COMMUNITY

## 2024-04-30 NOTE — PROGRESS NOTES
Jannie Villavicencioter presents today for   Chief Complaint   Patient presents with    Back Pain     Lumbar         Is someone accompanying this pt? no    Is the patient using any DME equipment during OV? no    Depression Screening:       No data to display                Learning Assessment:  Failed to redirect to the Timeline version of the SABIA SmartLink.    Abuse Screening:       No data to display                Fall Risk  Failed to redirect to the Timeline version of the SABIA SmartLink.    OPIOID RISK TOOL  Failed to redirect to the Timeline version of the SABIA SmartLink.    Coordination of Care:  1. Have you been to the ER, urgent care clinic since your last visit? no  Hospitalized since your last visit? no    2. Have you seen or consulted any other health care providers outside of the [facility] since your last visit? no Include any pap smears or colon screening. no          
stenosis.     -L2-3: No significant disc pathology. No spinal canal or foraminal stenosis.     -L3-4: No significant disc pathology. No spinal canal or foraminal stenosis.     -L4-5: No significant disc pathology. No spinal canal or foraminal stenosis.     -L5-S1: Mild disc desiccation. Posterior disc bulge causing mild central canal and mild bilateral neural foraminal stenosis.       EMG 2019  Extensive electrodiagnostic examination of the left lower extremity is normal. Specifically, there is no evidence of a lumbosacral motor radiculopathy nor of a peripheral neuropathy.     Stacy Mackey MD   Autonomic and Neuromuscular Neurologist   Past Medical History:   Past Medical History:   Diagnosis Date    Arthritis     Degenerative disc disease, lumbar     Failed total hip arthroplasty (HCC) 6/12/2020    Joint pain     Neck pain     Psychiatric disorder     bipolar, anxiety and depression, ADHD, panic attacks      Past Surgical History:   Past Surgical History:   Procedure Laterality Date    APPENDECTOMY      GYN      hysterectomy    HEENT  07/2016    nasal deviation-sinus    ORTHOPEDIC SURGERY Left 2016    decompression left hip    ORTHOPEDIC SURGERY Left     hip replacement    TOTAL HIP ARTHROPLASTY Left       SocHx:   Social History     Tobacco Use    Smoking status: Never    Smokeless tobacco: Never   Substance Use Topics    Alcohol use: Yes     Alcohol/week: 3.0 - 10.0 standard drinks of alcohol      FamHx:?   Family History   Problem Relation Age of Onset    Heart Disease Maternal Grandmother     Heart Disease Mother     Hypertension Mother        Current Medications:   Current Outpatient Medications   Medication Sig Dispense Refill    estradiol (VIVELLE) 0.05 MG/24HR APPLY 1 PATCH TRANSDERMALLY TWICE A WEEK      estradiol (ESTRACE) 0.5 MG tablet INSERT 1 TAB INTRAVAGINALLY ONCE A DAY      amphetamine-dextroamphetamine (ADDERALL XR) 30 MG extended release capsule Take 1 capsule by mouth daily. Max Daily

## 2024-06-25 ENCOUNTER — OFFICE VISIT (OUTPATIENT)
Age: 54
End: 2024-06-25
Payer: MEDICAID

## 2024-06-25 VITALS — HEART RATE: 72 BPM | BODY MASS INDEX: 34.01 KG/M2 | HEIGHT: 61 IN

## 2024-06-25 DIAGNOSIS — Z96.642 HISTORY OF LEFT HIP REPLACEMENT: Primary | ICD-10-CM

## 2024-06-25 DIAGNOSIS — M70.62 GREATER TROCHANTERIC BURSITIS OF LEFT HIP: ICD-10-CM

## 2024-06-25 PROCEDURE — 99214 OFFICE O/P EST MOD 30 MIN: CPT | Performed by: PHYSICAL MEDICINE & REHABILITATION

## 2024-06-25 RX ORDER — CETIRIZINE HYDROCHLORIDE 10 MG/1
1 TABLET ORAL
COMMUNITY

## 2024-06-25 NOTE — PROGRESS NOTES
[facility]  [facility], [address]  [place] [address]  Phone: [phone]  Fax: [phone]      Patient: Jannie Hammer                                                                              MRN: [id number]        YOB: 1970          AGE: 54 y.o.             PCP: Ninoska Martini MD  Date:  06/25/24    Reason for Consultation: Lower Back Pain      HPI:  Jannie Hammer is a 54 y.o. female with relevant PMH of left hip replacement initially with Dr. Fletcher - 10/26/2017she initially had leg length discrepancy and she ended up having a revision done 6/29/2020 with Dr. Callaway  who presents with low back pain radiating up and down her left leg.  The pain began 10 years ago. MRI lumbar spine 1/2024 without an left sided nerve impingement.  Since her last visit she has completed a course of PT and did find that dry needling was helpful temporarily.  She continues to have pain in her left hip/groin and down towards her knee.      Neurologic symptoms: + numbness, tingling-  left lateral thigh. No weakness, bowel or bladder changes.  No recent falls      Location: The pain is located in the left low back , left lateral hip, left groin   Radiation: The pain does radiate down left leg.    Pain Score: Currently: 7/10  At Best: 6/10  At Worst: 10/10   Quality: Pain is of a aching, throbbing, tenderquality.    Aggravating: Pain is exacerbated by exercise  Alleviating: The pain is alleviated by  icy hot    Prior Treatments:  Physical therapy: May 2024- June 19. 2024- at [facility] with dry needling  Injections:Yes LEFT L5-S1 Lumbar Transforminal Epidural - 1/30/2020 [facility] Dr Ling   Surgery:Consult- Dr. Garcia- 2022- for low back pain radiating down the left leg-   referred to pain management and PT , also seen by Dr. Zamora in 2020- referred to pain management for injections and to PT   Pain management Dr. Merida   Previous Medications: gabapentin

## (undated) DEVICE — THE CANADY HYBRID PLASMA SCALPEL IS AN ELECTROSURGICAL PLASMA SCALPEL THAT USES AN 85MM BENDABLE PADDLE BLADE TIP. THE ELECTROSURGICAL PLASMA SCALPEL IS USED TO SIMULTANEOUSLY CUT AND COAGULATE BIOLOGICAL TISSUE.: Brand: CANADY HYBRID PLASMA PADDLE BLADE

## (undated) DEVICE — DRESSING FOAM W4XL4IN SIL FACE BORD ADH PD SUP ABSRB COR

## (undated) DEVICE — Device

## (undated) DEVICE — BIPOLAR SEALER 23-301-1 AQM MBS: Brand: AQUAMANTYS™

## (undated) DEVICE — SOLUTION SCRB 4OZ 10% PVP I POVIDONE IOD TOP PAINT EXIDINE

## (undated) DEVICE — BANDAGE COBAN 6 IN WND 6INX5YD FOAM

## (undated) DEVICE — SOLUTION IRRIG 3000ML LAC R FLX CONT

## (undated) DEVICE — SWAB CULT LIQ STUART AGR AERB MOD IN BRK SGL RAYON TIP PLAS 220099] BECTON DICKINSON MICRO]

## (undated) DEVICE — ZIP 16 SURGICAL SKIN CLOSURE DEVICE: Brand: ZIP 16 SURGICAL SKIN CLOSURE DEVICE

## (undated) DEVICE — HANDPIECE SET WITH HIGH FLOW TIP AND SUCTION TUBE: Brand: INTERPULSE

## (undated) DEVICE — SUT VCRL + 2-0 36IN CT1 UD --

## (undated) DEVICE — SYRINGE IRRIG 60ML SFT PLIABLE BLB EZ TO GRP 1 HND USE W/

## (undated) DEVICE — BLADE SAW 1.27X13X90 MM FOR LG BNE

## (undated) DEVICE — PACK PROCEDURE SURG TOT HIP ANTR CARTER CUST

## (undated) DEVICE — DRESSING ALG W4XL8IN AG FOAM SUPERABSORBENT SIL ANTIMIC

## (undated) DEVICE — OSTEOTOME SURG W12XL76MM HIP THN FLEX

## (undated) DEVICE — OSTEOTOME SURG W10XL127MM HIP THN FLX

## (undated) DEVICE — SUT VCRL + 1 36IN CT1 VIO --

## (undated) DEVICE — GARMENT,MEDLINE,DVT,INT,CALF,MED, GEN2: Brand: MEDLINE

## (undated) DEVICE — SWAB CULT SGL AMIES W/O CHAR FOR THRT VAG SKIN HRT CULTSWAB

## (undated) DEVICE — 4.0MM EGG

## (undated) DEVICE — SUTURE STRATAFIX SPRL MCRYL + SZ 3-0 L18IN ABSRB UD PS-2 SXMP1B107